# Patient Record
Sex: FEMALE | Race: WHITE | NOT HISPANIC OR LATINO | Employment: FULL TIME | URBAN - METROPOLITAN AREA
[De-identification: names, ages, dates, MRNs, and addresses within clinical notes are randomized per-mention and may not be internally consistent; named-entity substitution may affect disease eponyms.]

---

## 2017-05-10 ENCOUNTER — HOSPITAL ENCOUNTER (OUTPATIENT)
Dept: RADIOLOGY | Facility: CLINIC | Age: 27
Discharge: HOME | End: 2017-05-10
Admitting: FAMILY MEDICINE
Payer: COMMERCIAL

## 2017-05-10 ENCOUNTER — OFFICE VISIT (OUTPATIENT)
Dept: URGENT CARE | Facility: CLINIC | Age: 27
End: 2017-05-10
Payer: COMMERCIAL

## 2017-05-10 DIAGNOSIS — R52 PAIN: ICD-10-CM

## 2017-05-10 PROCEDURE — G0382 LEV 3 HOSP TYPE B ED VISIT: HCPCS

## 2017-05-10 PROCEDURE — 72050 X-RAY EXAM NECK SPINE 4/5VWS: CPT

## 2018-01-12 NOTE — MISCELLANEOUS
Message  Return to work or school:    She is able to return to work on  Monday, 5/2/16    She is able to work with limitations (lifting restrictions)  Lifting restrictions: Week 1 to 2 of lifting no greater than 15lbs  Week 3 to 4 of lifting no greater than 25lbs  Noted faxed to Romelia Flores @ [phone] 4:20pm cs/lpn        Signatures   Electronically signed by : Moo Henley, ; Apr 28 2016  4:11PM EST                       (Author)    Electronically signed by : Bessie Clifton MD; May  4 2016  2:09PM EST                       (Author)

## 2018-05-30 ENCOUNTER — OFFICE VISIT (OUTPATIENT)
Dept: URGENT CARE | Facility: CLINIC | Age: 28
End: 2018-05-30
Payer: COMMERCIAL

## 2018-05-30 ENCOUNTER — HOSPITAL ENCOUNTER (OUTPATIENT)
Dept: CT IMAGING | Facility: HOSPITAL | Age: 28
Discharge: HOME | End: 2018-05-30
Payer: COMMERCIAL

## 2018-05-30 ENCOUNTER — TRANSCRIBE ORDERS (OUTPATIENT)
Dept: ADMINISTRATIVE | Facility: HOSPITAL | Age: 28
End: 2018-05-30

## 2018-05-30 ENCOUNTER — APPOINTMENT (OUTPATIENT)
Dept: LAB | Facility: HOSPITAL | Age: 28
End: 2018-05-30
Payer: COMMERCIAL

## 2018-05-30 ENCOUNTER — HOSPITAL ENCOUNTER (EMERGENCY)
Facility: HOSPITAL | Age: 28
Discharge: ANOTHER HOSPITAL | End: 2018-05-30
Attending: EMERGENCY MEDICINE
Payer: COMMERCIAL

## 2018-05-30 VITALS
SYSTOLIC BLOOD PRESSURE: 137 MMHG | HEIGHT: 60 IN | HEART RATE: 113 BPM | TEMPERATURE: 100.9 F | RESPIRATION RATE: 20 BRPM | WEIGHT: 212 LBS | BODY MASS INDEX: 41.62 KG/M2 | DIASTOLIC BLOOD PRESSURE: 81 MMHG | OXYGEN SATURATION: 99 %

## 2018-05-30 VITALS
DIASTOLIC BLOOD PRESSURE: 64 MMHG | WEIGHT: 212 LBS | RESPIRATION RATE: 16 BRPM | OXYGEN SATURATION: 98 % | BODY MASS INDEX: 41.62 KG/M2 | HEART RATE: 110 BPM | SYSTOLIC BLOOD PRESSURE: 112 MMHG | HEIGHT: 60 IN | TEMPERATURE: 100.9 F

## 2018-05-30 DIAGNOSIS — K59.00 CONSTIPATION, UNSPECIFIED CONSTIPATION TYPE: ICD-10-CM

## 2018-05-30 DIAGNOSIS — A41.9 SEPSIS (HCC): ICD-10-CM

## 2018-05-30 DIAGNOSIS — N39.0 ACUTE UTI: Primary | ICD-10-CM

## 2018-05-30 DIAGNOSIS — K50.819 CROHN'S DISEASE OF SMALL AND LARGE INTESTINES WITH COMPLICATION (HCC): ICD-10-CM

## 2018-05-30 DIAGNOSIS — R10.32 LEFT LOWER QUADRANT PAIN: ICD-10-CM

## 2018-05-30 DIAGNOSIS — R10.9 ABDOMINAL PAIN, UNSPECIFIED ABDOMINAL LOCATION: ICD-10-CM

## 2018-05-30 DIAGNOSIS — R10.9 ABDOMINAL PAIN, UNSPECIFIED ABDOMINAL LOCATION: Primary | ICD-10-CM

## 2018-05-30 DIAGNOSIS — N70.93 LEFT PYOSALPINX: Primary | ICD-10-CM

## 2018-05-30 LAB
ALBUMIN SERPL BCP-MCNC: 3.2 G/DL (ref 3.5–5)
ALP SERPL-CCNC: 55 U/L (ref 46–116)
ALT SERPL W P-5'-P-CCNC: 25 U/L (ref 12–78)
ANION GAP SERPL CALCULATED.3IONS-SCNC: 10 MMOL/L (ref 4–13)
AST SERPL W P-5'-P-CCNC: 16 U/L (ref 5–45)
BASOPHILS # BLD AUTO: 0.05 THOUSANDS/ΜL (ref 0–0.1)
BASOPHILS NFR BLD AUTO: 0 % (ref 0–1)
BILIRUB SERPL-MCNC: 0.8 MG/DL (ref 0.2–1)
BUN SERPL-MCNC: 10 MG/DL (ref 5–25)
CALCIUM SERPL-MCNC: 9.3 MG/DL (ref 8.3–10.1)
CHLORIDE SERPL-SCNC: 98 MMOL/L (ref 100–108)
CO2 SERPL-SCNC: 28 MMOL/L (ref 21–32)
CREAT SERPL-MCNC: 0.77 MG/DL (ref 0.6–1.3)
EOSINOPHIL # BLD AUTO: 0.08 THOUSAND/ΜL (ref 0–0.61)
EOSINOPHIL NFR BLD AUTO: 1 % (ref 0–6)
ERYTHROCYTE [DISTWIDTH] IN BLOOD BY AUTOMATED COUNT: 13.2 % (ref 11.6–15.1)
GFR SERPL CREATININE-BSD FRML MDRD: 105 ML/MIN/1.73SQ M
GLUCOSE SERPL-MCNC: 84 MG/DL (ref 65–140)
HCG SERPL QL: NEGATIVE
HCT VFR BLD AUTO: 36.8 % (ref 34.8–46.1)
HGB BLD-MCNC: 12 G/DL (ref 11.5–15.4)
IMM GRANULOCYTES # BLD AUTO: 0.07 THOUSAND/UL (ref 0–0.2)
IMM GRANULOCYTES NFR BLD AUTO: 0 % (ref 0–2)
LACTATE SERPL-SCNC: 0.8 MMOL/L (ref 0.5–2)
LYMPHOCYTES # BLD AUTO: 2.22 THOUSANDS/ΜL (ref 0.6–4.47)
LYMPHOCYTES NFR BLD AUTO: 14 % (ref 14–44)
MCH RBC QN AUTO: 27.1 PG (ref 26.8–34.3)
MCHC RBC AUTO-ENTMCNC: 32.6 G/DL (ref 31.4–37.4)
MCV RBC AUTO: 83 FL (ref 82–98)
MONOCYTES # BLD AUTO: 1.25 THOUSAND/ΜL (ref 0.17–1.22)
MONOCYTES NFR BLD AUTO: 8 % (ref 4–12)
NEUTROPHILS # BLD AUTO: 12.6 THOUSANDS/ΜL (ref 1.85–7.62)
NEUTS SEG NFR BLD AUTO: 77 % (ref 43–75)
NRBC BLD AUTO-RTO: 0 /100 WBCS
PLATELET # BLD AUTO: 499 THOUSANDS/UL (ref 149–390)
PMV BLD AUTO: 9.3 FL (ref 8.9–12.7)
POTASSIUM SERPL-SCNC: 3.8 MMOL/L (ref 3.5–5.3)
PROT SERPL-MCNC: 8 G/DL (ref 6.4–8.2)
RBC # BLD AUTO: 4.42 MILLION/UL (ref 3.81–5.12)
SL AMB  POCT GLUCOSE, UA: NEGATIVE
SL AMB LEUKOCYTE ESTERASE,UA: ABNORMAL
SL AMB POCT BILIRUBIN,UA: NEGATIVE
SL AMB POCT BLOOD,UA: ABNORMAL
SL AMB POCT CLARITY,UA: CLEAR
SL AMB POCT COLOR,UA: YELLOW
SL AMB POCT KETONES,UA: ABNORMAL
SL AMB POCT NITRITE,UA: NEGATIVE
SL AMB POCT PH,UA: 8.5
SL AMB POCT SPECIFIC GRAVITY,UA: 1
SL AMB POCT URINE PROTEIN: NEGATIVE
SL AMB POCT UROBILINOGEN: 0.2
SODIUM SERPL-SCNC: 136 MMOL/L (ref 136–145)
VIT B12 SERPL-MCNC: 292 PG/ML (ref 100–900)
WBC # BLD AUTO: 16.27 THOUSAND/UL (ref 4.31–10.16)

## 2018-05-30 PROCEDURE — 99284 EMERGENCY DEPT VISIT MOD MDM: CPT

## 2018-05-30 PROCEDURE — G0382 LEV 3 HOSP TYPE B ED VISIT: HCPCS | Performed by: FAMILY MEDICINE

## 2018-05-30 PROCEDURE — 85025 COMPLETE CBC W/AUTO DIFF WBC: CPT

## 2018-05-30 PROCEDURE — 80053 COMPREHEN METABOLIC PANEL: CPT

## 2018-05-30 PROCEDURE — 96365 THER/PROPH/DIAG IV INF INIT: CPT

## 2018-05-30 PROCEDURE — 83605 ASSAY OF LACTIC ACID: CPT | Performed by: EMERGENCY MEDICINE

## 2018-05-30 PROCEDURE — 87591 N.GONORRHOEAE DNA AMP PROB: CPT | Performed by: EMERGENCY MEDICINE

## 2018-05-30 PROCEDURE — 81002 URINALYSIS NONAUTO W/O SCOPE: CPT | Performed by: FAMILY MEDICINE

## 2018-05-30 PROCEDURE — 87491 CHLMYD TRACH DNA AMP PROBE: CPT | Performed by: EMERGENCY MEDICINE

## 2018-05-30 PROCEDURE — 96375 TX/PRO/DX INJ NEW DRUG ADDON: CPT

## 2018-05-30 PROCEDURE — 84703 CHORIONIC GONADOTROPIN ASSAY: CPT

## 2018-05-30 PROCEDURE — 87040 BLOOD CULTURE FOR BACTERIA: CPT | Performed by: EMERGENCY MEDICINE

## 2018-05-30 PROCEDURE — 36415 COLL VENOUS BLD VENIPUNCTURE: CPT

## 2018-05-30 PROCEDURE — 74177 CT ABD & PELVIS W/CONTRAST: CPT

## 2018-05-30 PROCEDURE — 82607 VITAMIN B-12: CPT

## 2018-05-30 PROCEDURE — 87086 URINE CULTURE/COLONY COUNT: CPT | Performed by: FAMILY MEDICINE

## 2018-05-30 RX ORDER — KETOROLAC TROMETHAMINE 30 MG/ML
15 INJECTION, SOLUTION INTRAMUSCULAR; INTRAVENOUS ONCE
Status: COMPLETED | OUTPATIENT
Start: 2018-05-30 | End: 2018-05-30

## 2018-05-30 RX ORDER — DOXYCYCLINE HYCLATE 100 MG/1
100 CAPSULE ORAL ONCE
Status: COMPLETED | OUTPATIENT
Start: 2018-05-30 | End: 2018-05-30

## 2018-05-30 RX ORDER — CIPROFLOXACIN 500 MG/1
500 TABLET, FILM COATED ORAL EVERY 12 HOURS SCHEDULED
Qty: 14 TABLET | Refills: 0 | Status: SHIPPED | OUTPATIENT
Start: 2018-05-30 | End: 2018-06-06

## 2018-05-30 RX ORDER — LEVONORGESTREL AND ETHINYL ESTRADIOL 0.1-0.02MG
KIT ORAL
COMMUNITY
Start: 2018-05-24 | End: 2021-06-22

## 2018-05-30 RX ADMIN — DOXYCYCLINE HYCLATE 100 MG: 100 CAPSULE ORAL at 22:31

## 2018-05-30 RX ADMIN — CEFOXITIN: 1 INJECTION, POWDER, FOR SOLUTION INTRAVENOUS at 22:34

## 2018-05-30 RX ADMIN — IOHEXOL 100 ML: 350 INJECTION, SOLUTION INTRAVENOUS at 19:32

## 2018-05-30 RX ADMIN — KETOROLAC TROMETHAMINE 15 MG: 30 INJECTION, SOLUTION INTRAMUSCULAR; INTRAVENOUS at 22:32

## 2018-05-30 NOTE — PATIENT INSTRUCTIONS
Urinalysis shows large leukocytes and trace blood  Treat with Cipro for 7 days duration given the presentation of sharp left lower quadrant abdominal pain which could be consistent with acute diverticulitis but lower suspicion given her age  Patient was instructed to follow up with PCP in 2 days if not improving on the antibiotics or go to the emergency department if symptoms worsen as she would ultimately need a CT scan of the abdomen and pelvis

## 2018-05-30 NOTE — PROGRESS NOTES
[address] [facility]        NAME: Beryl Churchill is a 28 y o  female  : 1990    MRN: [id number]  DATE: May 30, 2018  TIME: 1:13 PM    Assessment and Plan   Acute UTI [N39 0]  1  Acute UTI  ciprofloxacin (CIPRO) 500 mg tablet   2  Left lower quadrant pain  ciprofloxacin (CIPRO) 500 mg tablet         Patient Instructions       Urinalysis shows large leukocytes and trace blood  Treat with Cipro for 7 days duration given the presentation of sharp left lower quadrant abdominal pain which could be consistent with acute diverticulitis but lower suspicion given her age  Patient was instructed to follow up with PCP in 2 days if not improving on the antibiotics or go to the emergency department if symptoms worsen as she would ultimately need a CT scan of the abdomen and pelvis  Chief Complaint     Chief Complaint   Patient presents with    Abdominal Pain     Pt reports LLQ pain that began yesterday  She also reports nausea, dizziness & urinary urgency  History of Present Illness       Patient presents with complaint of predominantly left lower quadrant discomfort that began approximately 10 days ago had sharp in nature  She also had urinary urgency but the symptoms resolved when she started her menstrual cycle, once her cycle completed she started with recurrence of the same left lower quadrant sharp abdominal pain and urinary urgency  She does have a history of Crohn's disease status post resection of the terminal ileum  She is having nausea, no vomiting, she is constipated otherwise no change in the caliber of the stool, no melena or hematochezia no diarrhea  She denies dysuria or flank pain  She does have a current temp of 100 9° and she had chills last night  Review of Systems   Review of Systems   Constitutional: Positive for chills  HENT: Negative  Respiratory: Negative  Cardiovascular: Negative  Gastrointestinal: Positive for abdominal pain, constipation and nausea   Negative for anal bleeding, blood in stool, diarrhea, rectal pain and vomiting  Genitourinary: Positive for frequency  Negative for dysuria, flank pain, urgency, vaginal bleeding and vaginal discharge  Musculoskeletal: Negative  Current Medications       Current Outpatient Prescriptions:     AVIANE 0 1-20 MG-MCG per tablet, , Disp: , Rfl:     ciprofloxacin (CIPRO) 500 mg tablet, Take 1 tablet (500 mg total) by mouth every 12 (twelve) hours for 7 days, Disp: 14 tablet, Rfl: 0    Current Allergies     Allergies as of 05/30/2018 - Reviewed 05/30/2018   Allergen Reaction Noted    Humira [adalimumab] Other (See Comments) 04/22/2016            The following portions of the patient's history were reviewed and updated as appropriate: allergies, current medications, past family history, past medical history, past social history, past surgical history and problem list      Past Medical History:   Diagnosis Date    [facility]'s disease ([facility] )     RA [facility]rheumatoid arthritis) ([facility] )     Rheumatoid arthritis ([facility] )        Past Surgical History:   Procedure Laterality Date    APPENDECTOMY      2010    BOWEL RESECTION      2014    CT REPAIR INCISIONAL HERNIA,REDUCIBLE N/A 4/26/2016    Procedure: OPEN INCISIONAL HERNIA REPAIR X4 W/ UMBILICAL HERNIA REPAIR WITH MESH;  Surgeon: Bessie Huston MD;  Location: [facility] [place] OR;  Service: General    SALPINGECTOMY Right     2011       No family history on file  Medications have been verified  Objective   /64   Pulse (!) 110   Temp (!) 100 9 °F (38 3 °C)   Resp 16   Ht 5' (1 524 m)   Wt 96 2 kg (212 lb)   LMP 05/20/2018   SpO2 98%   BMI 41 40 kg/m²        Physical Exam     Physical Exam   Constitutional: She appears well-developed  No distress  Neck: Neck supple  Cardiovascular: Normal rate, regular rhythm and normal heart sounds  Pulmonary/Chest: Effort normal and breath sounds normal    Abdominal: Soft  Bowel sounds are normal  She exhibits no distension  There is tenderness  There is no rebound and no guarding  Tender to palpation suprapubic and left lower quadrant, no rebound rigidity or guarding   Lymphadenopathy:     She has no cervical adenopathy  Skin: She is not diaphoretic

## 2018-05-31 LAB — BACTERIA UR CULT: NORMAL

## 2018-05-31 NOTE — EMTALA/ACUTE CARE TRANSFER
[address]  [address] [place] [address]  Dept: [phone]      EMTALA TRANSFER CONSENT    NAME Glenn Newman                                         1990                              MRN [id number]    I have been informed of my rights regarding examination, treatment, and transfer   by Dr Daniel Cruz MD    Benefits: Continuity of care    Risks: Potential for delay in receiving treatment, Potential deterioration of medical condition, Loss of IV, Increased discomfort during transfer, Possible worsening of condition or death during transfer      Consent for Transfer:  I acknowledge that my medical condition has been evaluated and explained to me by the emergency department physician or other qualified medical person and/or my attending physician, who has recommended that I be transferred to the service of  Accepting Physician: Dr Lori Gonzalez at [address] Name, [address] : [facility]  The above potential benefits of such transfer, the potential risks associated with such transfer, and the probable risks of not being transferred have been explained to me, and I fully understand them  The doctor has explained that, in my case, the benefits of transfer outweigh the risks  I agree to be transferred  I authorize the performance of emergency medical procedures and treatments upon me in both transit and upon arrival at the receiving facility  Additionally, I authorize the release of any and all medical records to the receiving facility and request they be transported with me, if possible  I understand that the safest mode of transportation during a medical emergency is an ambulance and that the Hospital advocates the use of this mode of transport   Risks of traveling to the receiving facility by car, including absence of medical control, life sustaining equipment, such as oxygen, and medical personnel has been explained to me and I fully understand them  (GLENN CORRECT BOX BELOW)  [  ]  I consent to the stated transfer and to be transported by ambulance/helicopter  [  ]  I consent to the stated transfer, but refuse transportation by ambulance and accept full responsibility for my transportation by car  I understand the risks of non-ambulance transfers and I exonerate the Hospital and its staff from any deterioration in my condition that results from this refusal     X___________________________________________    DATE  18  TIME________  Signature of patient or legally responsible individual signing on patient behalf           RELATIONSHIP TO PATIENT_________________________          Provider Certification    NAME Giovana Segura                                         1990                              MRN [id number]    A medical screening exam was performed on the above named patient  Based on the examination:    Condition Necessitating Transfer The primary encounter diagnosis was Left pyosalpinx  A diagnosis of Sepsis ([address] ) was also pertinent to this visit  Patient Condition: The patient has been stabilized such that within reasonable medical probability, no material deterioration of the patient condition or the condition of the unborn child(kenton) is likely to result from the transfer    Reason for Transfer:      Transfer Requirements: [facility]   · Space available and qualified personnel available for treatment as acknowledged by    · Agreed to accept transfer and to provide appropriate medical treatment as acknowledged by       Dr Segun Villafuerte  · Appropriate medical records of the examination and treatment of the patient are provided at the time of transfer   [address], [address] _______  · Transfer will be performed by qualified personnel from    and appropriate transfer equipment as required, including the use of necessary and appropriate life support measures      Provider Certification: I have examined the patient and explained the following risks and benefits of being transferred/refusing transfer to the patient/family:  General risk, such as traffic hazards, adverse weather conditions, rough terrain or turbulence, possible failure of equipment (including vehicle or aircraft), or consequences of actions of persons outside the control of the transport personnel, Unanticipated needs of medical equipment and personnel during transport, Risk of worsening condition, The possibility of a transport vehicle being unavailable      Based on these reasonable risks and benefits to the patient and/or the unborn child(kenton), and based upon the information available at the time of the patients examination, I certify that the medical benefits reasonably to be expected from the provision of appropriate medical treatments at another medical facility outweigh the increasing risks, if any, to the individuals medical condition, and in the case of labor to the unborn child, from effecting the transfer      X____________________________________________ DATE 05/30/18        TIME_______      ORIGINAL - SEND TO MEDICAL RECORDS   COPY - SEND WITH PATIENT DURING TRANSFER

## 2018-05-31 NOTE — ED PROVIDER NOTES
History  Chief Complaint   Patient presents with    Abdominal Pain     Patient states"she was seen here for a CT scan of the abdomen today and contacted by her physician for abnormal CT scan results"  Patient is having LLQ abdominal pain along with nausea  28year old  presents for evaluation of LLQ abdominal pain worsening for the past 2 days  Patient states she had a similar pain 2 weeks ago which had resolved during her menstrual cycle, recurring yesterday  Patient reports sharp pain of the LLQ associated with subjective fevers and chills  Patient has not taken anything for her symptoms prior to presentation  Patient saw her PCP earlier today and had labs and CT performed  CT suggestive of pyosalpinx concerning for PID  Leukocytosis on outpatient labs  Negative serum beta hcg today  Patient has history of prior salpingectomy for similar presentation  Multiple prior abdominal surgeries including appendectomy and bowel resection for Crohns  Patient states she is sexually active with a single partner and uses condoms regularly  She denies vaginal bleeding or discharge           History provided by:  Patient  Abdominal Pain   Pain location:  LLQ  Pain quality: sharp    Pain radiates to:  Does not radiate  Pain severity:  Mild  Onset quality:  Gradual  Duration:  2 days  Timing:  Constant  Progression:  Waxing and waning  Chronicity:  New  Context: previous surgery    Relieved by:  None tried  Worsened by:  Nothing  Ineffective treatments:  None tried  Associated symptoms: chills and fever    Associated symptoms: no chest pain, no constipation, no cough, no diarrhea, no dysuria, no hematuria, no nausea, no shortness of breath, no sore throat and no vomiting    Fever:     Duration:  2 days    Timing:  Intermittent    Temp source:  Subjective    Progression:  Waxing and waning  Risk factors: multiple surgeries and obesity        Prior to Admission Medications   Prescriptions Last Dose Informant Patient Reported? Taking? AVIANE 0 1-20 MG-MCG per tablet   Yes No   ciprofloxacin (CIPRO) 500 mg tablet   No No   Sig: Take 1 tablet (500 mg total) by mouth every 12 (twelve) hours for 7 days      Facility-Administered Medications: None       Past Medical History:   Diagnosis Date    [facility]'s disease ([facility] )     RA (rheumatoid arthritis) ([place] [address] )     Rheumatoid arthritis ([place] [address] )        Past Surgical History:   Procedure Laterality Date    APPENDECTOMY      2010    BOWEL RESECTION      2014    VT REPAIR INCISIONAL HERNIA,REDUCIBLE N/A 4/26/2016    Procedure: OPEN INCISIONAL HERNIA REPAIR X4 W/ UMBILICAL HERNIA REPAIR WITH MESH;  Surgeon: Shanta Naik MD;  Location: [facility] OR;  Service: General    SALPINGECTOMY Right     2011       History reviewed  No pertinent family history  I have reviewed and agree with the history as documented  Social History   Substance Use Topics    Smoking status: Never Smoker    Smokeless tobacco: Never Used    Alcohol use No        Review of Systems   Constitutional: Positive for chills and fever  Negative for appetite change  HENT: Negative for congestion, rhinorrhea and sore throat  Respiratory: Negative for cough, chest tightness and shortness of breath  Cardiovascular: Negative for chest pain, palpitations and leg swelling  Gastrointestinal: Positive for abdominal pain  Negative for constipation, diarrhea, nausea and vomiting  Genitourinary: Negative for dysuria, frequency and hematuria  Musculoskeletal: Negative for myalgias, neck pain and neck stiffness  Skin: Negative for pallor  Neurological: Negative for syncope, weakness and headaches  All other systems reviewed and are negative  Physical Exam  Physical Exam   Constitutional: She is oriented to person, place, and time  She appears well-developed and well-nourished  Non-toxic appearance  No distress  HENT:   Head: Normocephalic and atraumatic     Eyes: Conjunctivae and EOM are normal  Pupils are equal, round, and reactive to light  Neck: Normal range of motion  Neck supple  No tracheal deviation present  No thyromegaly present  Cardiovascular: Regular rhythm, normal heart sounds and intact distal pulses  Tachycardia present  Pulmonary/Chest: Effort normal and breath sounds normal    Abdominal: Soft  Bowel sounds are normal  She exhibits no distension  There is tenderness in the left lower quadrant  There is no rigidity, no rebound and no guarding  Genitourinary: Uterus is tender  Cervix exhibits no motion tenderness and no friability  Left adnexum displays tenderness  Vaginal discharge (mild) found  Lymphadenopathy:     She has no cervical adenopathy  Neurological: She is alert and oriented to person, place, and time  Skin: Skin is warm and dry  She is not diaphoretic  Psychiatric: She has a normal mood and affect  Her behavior is normal  Judgment and thought content normal    Nursing note and vitals reviewed        Vital Signs  ED Triage Vitals [05/30/18 2121]   Temperature Pulse Respirations Blood Pressure SpO2   (!) 100 7 °F (38 2 °C) (!) 113 20 137/81 99 %      Temp Source Heart Rate Source Patient Position - Orthostatic VS BP Location FiO2 (%)   Tympanic Monitor Lying Right arm --      Pain Score       --           Vitals:    05/30/18 2121   BP: 137/81   Pulse: (!) 113   Patient Position - Orthostatic VS: Lying       Visual Acuity      ED Medications  Medications   cefOXitin (MEFOXIN) 2,000 mg in sodium chloride 0 9 % 50 mL IVPB ( Intravenous New Bag 5/30/18 2234)   doxycycline hyclate (VIBRAMYCIN) capsule 100 mg (100 mg Oral Given 5/30/18 2231)   ketorolac (TORADOL) injection 15 mg (15 mg Intravenous Given 5/30/18 2232)       Diagnostic Studies  Results Reviewed     Procedure Component Value Units Date/Time    Chlamydia/GC amplified DNA by PCR [[id number]] Collected:  05/30/18 2236    Lab Status:  No result Specimen:  Genital from Cervix     Lactic acid, plasma [[id number]]  (Normal) Collected:  05/30/18 2202    Lab Status:  Final result Specimen:  Blood from Arm, Right Updated:  05/30/18 2235     LACTIC ACID 0 8 mmol/L     Narrative:         Result may be elevated if tourniquet was used during collection  Blood culture #1 [[id number]] Collected:  05/30/18 2202    Lab Status: In process Specimen:  Blood from Arm, Left Updated:  05/30/18 2214    Blood culture #2 [[id number]] Collected:  05/30/18 2202    Lab Status: In process Specimen:  Blood from Arm, Right Updated:  05/30/18 2214                 No orders to display              Procedures  Procedures       Phone Contacts  ED Phone Contact    ED Course  ED Course as of May 30 2239   Wed May 30, 2018   2203 Pulse: (!) 113   2203 Temperature: (!) 100 7 °F (38 2 °C)   2203 Leukocytosis of 16 on labs performed today                          Initial Sepsis Screening     Row Name 05/30/18 2239 05/30/18 2153             Is the patient's history suggestive of a new or worsening infection?  -- (!)  Yes (Proceed)  -EE       Suspected source of infection  -- --   pelvic infection  -EE       Are two or more of the following signs & symptoms of infection both present and new to the patient?  -- (!)  Yes (Proceed)  -EE       Indicate SIRS criteria  -- Tachycardia > 90 bpm;Leukocytosis (WBC > [address]34[address] IJL)  -EE       If the answer is yes to both questions, suspicion of sepsis is present  --  --       If severe sepsis is present AND tissue hypoperfusion perists in the hour after fluid resuscitation or lactate > 4, the patient meets criteria for SEPTIC SHOCK  --  --       Are any of the following organ dysfunction criteria present within 6 hours of suspected infection and SIRS criteria that are NOT considered to be chronic conditions?  No  -EE  --       Organ dysfunction  --  --       Date of presentation of severe sepsis  --  --       Time of presentation of severe sepsis  --  --       Tissue hypoperfusion persists in the hour after crystalloid fluid administration, evidenced, by either:  --  --       Was hypotension present within one hour of the conclusion of crystalloid fluid administration?  --  --       Date of presentation of septic shock  --  --       Time of presentation of septic shock  --  --         User Key  (r) = Recorded By, (t) = Taken By, (c) = Cosigned By    [address] Name Provider Type     Patricia Florez MD Physician                  MDM  Number of Diagnoses or Management Options  Left pyosalpinx: new and requires workup  Sepsis [facility]): new and requires workup  Diagnosis management comments: 28year old female presenting for evaluation of possible complicated PID with pyosalpinx on outpatient CT  Patient meets sepsis criteria  Blood cultures sent  Antibiotics for PID/TOA ordered  Discussed case with Dr Asif Antoine of [address] [place] [address]) who recommended transfer to [facility] [place] for continuity of care as patient's OB/GYN is with [facility]          Amount and/or Complexity of Data Reviewed  Clinical lab tests: reviewed  Tests in the radiology section of CPT®: reviewed    Patient Progress  Patient progress: stable    CritCare Time    Disposition  Final diagnoses:   Left pyosalpinx   Sepsis ([address] )     Time reflects when diagnosis was documented in both MDM as applicable and the Disposition within this note     Time User Action Codes Description Comment    5/30/2018 10:29 PM Jono Morgan Add [N70 93] Left pyosalpinx     5/30/2018 10:29 PM Jono Morgan Add [A41 9] Sepsis [facility])       ED Disposition     ED Disposition Condition Comment    Transfer to Another Facility  Kiana Cooley should be transferred out to Albino Hardy MD Documentation      Most Recent Value   Patient Condition  The patient has been stabilized such that within reasonable medical probability, no material deterioration of the patient condition or the condition of the unborn child(kenton) is likely to result from the transfer Benefits of Transfer  Continuity of care   Risks of Transfer  Potential for delay in receiving treatment, Potential deterioration of medical condition, Loss of IV, Increased discomfort during transfer, Possible worsening of condition or death during transfer   Accepting Physician  Dr Serenity Simpson Name, James Stark   Provider Certification  General risk, such as traffic hazards, adverse weather conditions, rough terrain or turbulence, possible failure of equipment (including vehicle or aircraft), or consequences of actions of persons outside the control of the transport personnel, Unanticipated needs of medical equipment and personnel during transport, Risk of worsening condition, The possibility of a transport vehicle being unavailable      RN Documentation      Most [facility] Name, James Moore      Follow-up Information    None         Patient's Medications   Discharge Prescriptions    No medications on file     No discharge procedures on file      ED Provider  Electronically Signed by           Dolores Beyer MD  05/30/18 3181

## 2018-05-31 NOTE — EMTALA/ACUTE CARE TRANSFER
[address]  [address] [facility] [address]  Dept: [phone]      EMTALA TRANSFER CONSENT    NAME Juan Antonio Vasques                                         1990                              MRN [id number]    I have been informed of my rights regarding examination, treatment, and transfer   by Dr Chay Parker DO    Benefits: Continuity of care    Risks: Potential for delay in receiving treatment, Potential deterioration of medical condition, Loss of IV, Increased discomfort during transfer, Possible worsening of condition or death during transfer      Transfer Request   I acknowledge that my medical condition has been evaluated and explained to me by the emergency department physician or other qualified medical person and/or my attending physician who has recommended and offered to me further medical examination and treatment  I understand the Hospital's obligation with respect to the treatment and stabilization of my emergency medical condition  I nevertheless request to be transferred  I release the Hospital, the doctor, and any other persons caring for me from all responsibility or liability for any injury or ill effects that may result from my transfer and agree to accept all responsibility for the consequences of my choice to transfer, rather than receive stabilizing treatment at the Hospital  I understand that because the transfer is my request, my insurance may not provide reimbursement for the services  The Hospital will assist and direct me and my family in how to make arrangements for transfer, but the hospital is not liable for any fees charged by the transport service    In spite of this understanding, I refuse to consent to further medical examination and treatment which has been offered to me, and request transfer to [address], [address] : [facility]  I authorize the performance of emergency medical procedures and treatments upon me in both transit and upon arrival at the receiving facility  Additionally, I authorize the release of any and all medical records to the receiving facility and request they be transported with me, if possible  I authorize the performance of emergency medical procedures and treatments upon me in both transit and upon arrival at the receiving facility  Additionally, I authorize the release of any and all medical records to the receiving facility and request they be transported with me, if possible  I understand that the safest mode of transportation during a medical emergency is an ambulance and that the Hospital advocates the use of this mode of transport  Risks of traveling to the receiving facility by car, including absence of medical control, life sustaining equipment, such as oxygen, and medical personnel has been explained to me and I fully understand them  (GLENN CORRECT BOX BELOW)  [  ]  I consent to the stated transfer and to be transported by ambulance/helicopter  [  ]  I consent to the stated transfer, but refuse transportation by ambulance and accept full responsibility for my transportation by car  I understand the risks of non-ambulance transfers and I exonerate the Hospital and its staff from any deterioration in my condition that results from this refusal     X___________________________________________    DATE  18  TIME________  Signature of patient or legally responsible individual signing on patient behalf           RELATIONSHIP TO PATIENT_________________________          Provider Certification    NAME Kiana Cooley                                         1990                              MRN [id number]    A medical screening exam was performed on the above named patient  Based on the examination:    Condition Necessitating Transfer The primary encounter diagnosis was Left pyosalpinx   A diagnosis of Sepsis ([address] ) was also pertinent to this visit  Patient Condition: The patient has been stabilized such that within reasonable medical probability, no material deterioration of the patient condition or the condition of the unborn child(kenton) is likely to result from the transfer    Reason for Transfer:      Transfer Requirements: [facility]   · Space available and qualified personnel available for treatment as acknowledged by    · Agreed to accept transfer and to provide appropriate medical treatment as acknowledged by       Dr Michael Rico  · Appropriate medical records of the examination and treatment of the patient are provided at the time of transfer   [address] [facility], [address] _______  · Transfer will be performed by qualified personnel from [facility]  and appropriate transfer equipment as required, including the use of necessary and appropriate life support measures      Provider Certification: I have examined the patient and explained the following risks and benefits of being transferred/refusing transfer to the patient/family:  General risk, such as traffic hazards, adverse weather conditions, rough terrain or turbulence, possible failure of equipment (including vehicle or aircraft), or consequences of actions of persons outside the control of the transport personnel, Unanticipated needs of medical equipment and personnel during transport, Risk of worsening condition, The possibility of a transport vehicle being unavailable      Based on these reasonable risks and benefits to the patient and/or the unborn child(kenton), and based upon the information available at the time of the patients examination, I certify that the medical benefits reasonably to be expected from the provision of appropriate medical treatments at another medical facility outweigh the increasing risks, if any, to the individuals medical condition, and in the case of labor to the unborn child, from effecting the transfer      X____________________________________________ DATE 05/30/18        TIME_______      ORIGINAL - SEND TO MEDICAL RECORDS   COPY - SEND WITH PATIENT DURING TRANSFER

## 2018-06-01 LAB
CHLAMYDIA DNA CVX QL NAA+PROBE: NORMAL
N GONORRHOEA DNA GENITAL QL NAA+PROBE: NORMAL

## 2018-06-05 LAB
BACTERIA BLD CULT: NORMAL
BACTERIA BLD CULT: NORMAL

## 2019-09-19 ENCOUNTER — OFFICE VISIT (OUTPATIENT)
Dept: URGENT CARE | Facility: CLINIC | Age: 29
End: 2019-09-19
Payer: COMMERCIAL

## 2019-09-19 VITALS
RESPIRATION RATE: 16 BRPM | DIASTOLIC BLOOD PRESSURE: 78 MMHG | BODY MASS INDEX: 39.27 KG/M2 | OXYGEN SATURATION: 97 % | TEMPERATURE: 101.1 F | HEIGHT: 60 IN | SYSTOLIC BLOOD PRESSURE: 124 MMHG | WEIGHT: 200 LBS | HEART RATE: 94 BPM

## 2019-09-19 DIAGNOSIS — J01.00 ACUTE NON-RECURRENT MAXILLARY SINUSITIS: Primary | ICD-10-CM

## 2019-09-19 PROCEDURE — G0382 LEV 3 HOSP TYPE B ED VISIT: HCPCS | Performed by: PREVENTIVE MEDICINE

## 2019-09-19 RX ORDER — AMOXICILLIN 500 MG/1
500 CAPSULE ORAL EVERY 8 HOURS SCHEDULED
Qty: 21 CAPSULE | Refills: 0 | Status: SHIPPED | OUTPATIENT
Start: 2019-09-19 | End: 2019-09-26

## 2019-09-19 NOTE — PROGRESS NOTES
[address]        NAME: Afia Simmons is a 33 y o  female  : 1990    MRN: [id number]  DATE: 2019  TIME: 6:05 PM    Assessment and Plan   Acute non-recurrent maxillary sinusitis [J01 00]  1  Acute non-recurrent maxillary sinusitis  amoxicillin (AMOXIL) 500 mg capsule         Patient Instructions       Follow up with PCP in 3-5 days  Proceed to  ER if symptoms worsen  Chief Complaint     Chief Complaint   Patient presents with    Cold Like Symptoms     began about 10 days ago  pt reports sinus pressure, HA, b/l eye pain and nasal congestion  History of Present Illness       A week-long history of on and off fever, nasal congestion, and increasing pressure and pain over the maxillary sinuses bilaterally  Mild cough  Review of Systems   Review of Systems   Constitutional: Positive for fever  HENT: Positive for congestion and sinus pressure  Respiratory: Positive for cough            Current Medications       Current Outpatient Medications:     AVIANE 0 1-20 MG-MCG per tablet, , Disp: , Rfl:     amoxicillin (AMOXIL) 500 mg capsule, Take 1 capsule (500 mg total) by mouth every 8 (eight) hours for 7 days, Disp: 21 capsule, Rfl: 0    Current Allergies     Allergies as of 2019 - Reviewed 2019   Allergen Reaction Noted    Humira [adalimumab] Other (See Comments) 2016            The following portions of the patient's history were reviewed and updated as appropriate: allergies, current medications, past family history, past medical history, past social history, past surgical history and problem list      Past Medical History:   Diagnosis Date    [facility] )     RA (rheumatoid arthritis) ([facility] )     Rheumatoid arthritis ([facility] )        Past Surgical History:   Procedure Laterality Date    APPENDECTOMY          BOWEL RESECTION          MA REPAIR INCISIONAL HERNIA,REDUCIBLE N/A 2016    Procedure: OPEN INCISIONAL HERNIA REPAIR X4 W/ UMBILICAL HERNIA REPAIR WITH MESH;  Surgeon: Melanie Caraballo MD;  Location: [facility];  Service: General    SALPINGECTOMY Right     2011       History reviewed  No pertinent family history  Medications have been verified  Objective   /78   Pulse 94   Temp (!) 101 1 °F (38 4 °C)   Resp 16   Ht 5' (1 524 m)   Wt 90 7 kg (200 lb)   SpO2 97%   BMI 39 06 kg/m²        Physical Exam     Physical Exam   HENT:   Right Ear: External ear normal    Left Ear: External ear normal    Mouth/Throat: Oropharynx is clear and moist    Sensation of discomfort and pain when tapping both maxillary sinuses   Cardiovascular: Normal heart sounds  Pulmonary/Chest: Breath sounds normal    Lymphadenopathy:     She has no cervical adenopathy

## 2020-10-14 ENCOUNTER — TELEPHONE (OUTPATIENT)
Dept: GASTROENTEROLOGY | Facility: CLINIC | Age: 30
End: 2020-10-14

## 2020-10-14 DIAGNOSIS — K50.813 CROHN'S DISEASE OF BOTH SMALL AND LARGE INTESTINE WITH FISTULA (HCC): Primary | ICD-10-CM

## 2020-10-14 RX ORDER — ONDANSETRON 4 MG/1
4 TABLET, FILM COATED ORAL EVERY 8 HOURS PRN
Qty: 20 TABLET | Refills: 0 | Status: SHIPPED | OUTPATIENT
Start: 2020-10-14 | End: 2021-11-15

## 2020-11-27 ENCOUNTER — TELEPHONE (OUTPATIENT)
Dept: GASTROENTEROLOGY | Facility: CLINIC | Age: 30
End: 2020-11-27

## 2020-11-27 ENCOUNTER — OFFICE VISIT (OUTPATIENT)
Dept: GASTROENTEROLOGY | Facility: CLINIC | Age: 30
End: 2020-11-27
Payer: COMMERCIAL

## 2020-11-27 VITALS
BODY MASS INDEX: 40.84 KG/M2 | WEIGHT: 208 LBS | HEIGHT: 60 IN | DIASTOLIC BLOOD PRESSURE: 68 MMHG | SYSTOLIC BLOOD PRESSURE: 120 MMHG

## 2020-11-27 DIAGNOSIS — K50.813 CROHN'S DISEASE OF BOTH SMALL AND LARGE INTESTINE WITH FISTULA (HCC): Primary | ICD-10-CM

## 2020-11-27 DIAGNOSIS — R10.32 LEFT LOWER QUADRANT ABDOMINAL PAIN: ICD-10-CM

## 2020-11-27 PROCEDURE — 99215 OFFICE O/P EST HI 40 MIN: CPT | Performed by: INTERNAL MEDICINE

## 2020-11-27 RX ORDER — MESALAMINE 1.2 G/1
2400 TABLET, DELAYED RELEASE ORAL
Qty: 60 TABLET | Refills: 5 | Status: SHIPPED | OUTPATIENT
Start: 2020-11-27 | End: 2021-09-27 | Stop reason: SDUPTHER

## 2020-12-18 DIAGNOSIS — K50.00 CROHN'S DISEASE OF SMALL INTESTINE WITHOUT COMPLICATION (HCC): Primary | ICD-10-CM

## 2020-12-18 RX ORDER — SODIUM CHLORIDE 9 MG/ML
20 INJECTION, SOLUTION INTRAVENOUS ONCE
Status: CANCELLED | OUTPATIENT
Start: 2021-01-08

## 2020-12-22 RX ORDER — USTEKINUMAB 90 MG/ML
90 INJECTION, SOLUTION SUBCUTANEOUS
Qty: 1 ML | Refills: 6 | Status: SHIPPED | OUTPATIENT
Start: 2020-12-22 | End: 2021-01-28 | Stop reason: SDUPTHER

## 2020-12-23 ENCOUNTER — HOSPITAL ENCOUNTER (OUTPATIENT)
Dept: INFUSION CENTER | Facility: HOSPITAL | Age: 30
End: 2020-12-23

## 2021-01-05 DIAGNOSIS — K50.00 CROHN'S DISEASE OF SMALL INTESTINE WITHOUT COMPLICATION (HCC): Primary | ICD-10-CM

## 2021-01-07 ENCOUNTER — TELEPHONE (OUTPATIENT)
Dept: GASTROENTEROLOGY | Facility: CLINIC | Age: 31
End: 2021-01-07

## 2021-01-07 NOTE — TELEPHONE ENCOUNTER
Lilly Mello from [facility] left VM mssg stating Pt has infusion tomorrow of Stelara; needs date changed in the computer to the 8th  CB # [id number]

## 2021-01-08 ENCOUNTER — HOSPITAL ENCOUNTER (OUTPATIENT)
Dept: INFUSION CENTER | Facility: HOSPITAL | Age: 31
Discharge: HOME | End: 2021-01-08
Payer: COMMERCIAL

## 2021-01-08 VITALS
DIASTOLIC BLOOD PRESSURE: 85 MMHG | HEART RATE: 69 BPM | RESPIRATION RATE: 18 BRPM | SYSTOLIC BLOOD PRESSURE: 137 MMHG | BODY MASS INDEX: 41.03 KG/M2 | WEIGHT: 210.1 LBS | TEMPERATURE: 97.9 F

## 2021-01-08 DIAGNOSIS — K50.00 CROHN'S DISEASE OF SMALL INTESTINE WITHOUT COMPLICATION (HCC): Primary | ICD-10-CM

## 2021-01-08 PROCEDURE — 96365 THER/PROPH/DIAG IV INF INIT: CPT

## 2021-01-08 RX ORDER — SODIUM CHLORIDE 9 MG/ML
20 INJECTION, SOLUTION INTRAVENOUS ONCE
Status: COMPLETED | OUTPATIENT
Start: 2021-01-08 | End: 2021-01-08

## 2021-01-08 RX ORDER — SODIUM CHLORIDE 9 MG/ML
20 INJECTION, SOLUTION INTRAVENOUS ONCE
Status: CANCELLED | OUTPATIENT
Start: 2021-01-08

## 2021-01-08 RX ADMIN — USTEKINUMAB 520 MG: 130 SOLUTION INTRAVENOUS at 10:48

## 2021-01-08 RX ADMIN — SODIUM CHLORIDE 20 ML/HR: 0.9 INJECTION, SOLUTION INTRAVENOUS at 10:48

## 2021-01-08 NOTE — PLAN OF CARE
Problem: Potential for Falls  Goal: Patient will remain free of falls  Description: INTERVENTIONS:  - Assess patient frequently for physical needs  -  Identify cognitive and physical deficits and behaviors that affect risk of falls    -  Luther fall precautions as indicated by assessment   - Educate patient/family on patient safety including physical limitations  - Instruct patient to call for assistance with activity based on assessment  - Modify environment to reduce risk of injury  - Consider OT/PT consult to assist with strengthening/mobility  Outcome: Progressing

## 2021-01-28 DIAGNOSIS — K50.813 CROHN'S DISEASE OF BOTH SMALL AND LARGE INTESTINE WITH FISTULA (HCC): ICD-10-CM

## 2021-01-29 RX ORDER — USTEKINUMAB 90 MG/ML
90 INJECTION, SOLUTION SUBCUTANEOUS
Qty: 1 ML | Refills: 6 | Status: SHIPPED | OUTPATIENT
Start: 2021-01-29 | End: 2021-02-05 | Stop reason: SDUPTHER

## 2021-02-04 DIAGNOSIS — K50.813 CROHN'S DISEASE OF BOTH SMALL AND LARGE INTESTINE WITH FISTULA (HCC): ICD-10-CM

## 2021-02-04 NOTE — TELEPHONE ENCOUNTER
Marian from [facility] in [place] left  mssg stating Stelara 90 mg syringe Rx rec'd 1/29 requires prior auth for approval  If their assistance is requested they need chart notes   ph # [phone] or can fax info to [phone]

## 2021-02-05 RX ORDER — USTEKINUMAB 90 MG/ML
90 INJECTION, SOLUTION SUBCUTANEOUS
Qty: 1 ML | Refills: 6 | Status: SHIPPED | OUTPATIENT
Start: 2021-02-05 | End: 2022-03-10

## 2021-02-05 NOTE — TELEPHONE ENCOUNTER
Camacho DUQUE was approved for Jameel Lynne (:88)  VS#[id number]  21 - 21     Her insurance requires her to use [facility]

## 2021-02-05 NOTE — TELEPHONE ENCOUNTER
I left a voicemail for patient advising her Stelara Injection has been approved  [facility]'s number to contact them is [phone] she is due for her first injection 3/5/21  Patient advised to call with any questions

## 2021-02-12 ENCOUNTER — OFFICE VISIT (OUTPATIENT)
Dept: GASTROENTEROLOGY | Facility: CLINIC | Age: 31
End: 2021-02-12
Payer: COMMERCIAL

## 2021-02-12 VITALS
SYSTOLIC BLOOD PRESSURE: 118 MMHG | HEART RATE: 69 BPM | BODY MASS INDEX: 41.07 KG/M2 | DIASTOLIC BLOOD PRESSURE: 80 MMHG | HEIGHT: 60 IN | WEIGHT: 209.2 LBS

## 2021-02-12 DIAGNOSIS — K50.813 CROHN'S DISEASE OF BOTH SMALL AND LARGE INTESTINE WITH FISTULA (HCC): Primary | ICD-10-CM

## 2021-02-12 PROCEDURE — 99214 OFFICE O/P EST MOD 30 MIN: CPT | Performed by: INTERNAL MEDICINE

## 2021-02-12 NOTE — PROGRESS NOTES
[facility]  Missael Lau 31 y o  female MRN: [id number]  Encounter: [id number]    ASSESSMENT AND PLAN:      1  Crohn's disease of both small and large intestine with fistula ([facility] )    Doing reasonably well on Stelara she has only received 1 dose her infusion  Recommend continuing  I would also continue her Lialda  I have ordered routine blood work to be done about 3-4 weeks from now  I asked her to contact us if she develops any symptoms in the interim  She is due for colonoscopy however given the recent exacerbation and the micro perforation on CT scan would like to delay this for at least 6-12 months  - Comprehensive metabolic panel; Future  - CBC; Future  - C-reactive protein; Future      Followup Appointment:   Six months  ______________________________________________________________________    Chief Complaint   Patient presents with    Medication Management     Stelara      HPI:  Patient doing reasonably well  No diarrhea no rectal bleeding no abdominal pain  Her chronic joint pain is actually slightly better  She received the in the Stelara infusion in the beginning of January  She has noticed some itching on her neck and shoulders but no rash      Historical Information   Past Medical History:   Diagnosis Date    Crohn's disease ([facility] )     RA (rheumatoid arthritis) ([facility] )     Rheumatoid arthritis ([facility] )      Past Surgical History:   Procedure Laterality Date    APPENDECTOMY      2010    BOWEL RESECTION      2014    MN REPAIR INCISIONAL HERNIA,REDUCIBLE N/A 4/26/2016    Procedure: OPEN INCISIONAL HERNIA REPAIR X4 W/ UMBILICAL HERNIA REPAIR WITH MESH;  Surgeon: Noemi Sky MD;  Location:  MAIN OR;  Service: General    SALPINGECTOMY Right     2011     Social History     Substance and Sexual Activity   Alcohol Use No     Social History     Substance and Sexual Activity   Drug Use No     Social History     Tobacco Use Smoking Status Never Smoker   Smokeless Tobacco Never Used     History reviewed  No pertinent family history  Current Outpatient Medications:     AVIANE 0 1-20 MG-MCG per tablet    mesalamine (LIALDA) 1 2 g EC tablet    ustekinumab (Stelara) 90 mg/mL subcutaneous injection    ondansetron (ZOFRAN) 4 mg tablet  Allergies   Allergen Reactions    Humira [Adalimumab] Other (See Comments)     Made psoriasis worsen     Reviewed medications and allergies and updated as indicated    PHYSICAL EXAM:    Blood pressure 118/80, pulse 69, height 5' (1 524 m), weight 94 9 kg (209 lb 3 2 oz), not currently breastfeeding  Body mass index is 40 86 kg/m²  General Appearance: NAD, cooperative, alert  Eyes: Anicteric, PERRLA, EOMI  ENT:  Normocephalic, atraumatic, normal mucosa  Neck:  Supple, symmetrical, trachea midline  Resp:  Clear to auscultation bilaterally; no rales, rhonchi or wheezing; respirations unlabored   CV:  S1 S2, Regular rate and rhythm; no murmur, rub, or gallop  GI:  Soft, non-tender, non-distended; normal bowel sounds; no masses, no organomegaly   Rectal: Deferred  Musculoskeletal: No cyanosis, clubbing or edema  Normal ROM  Skin:  No jaundice, rashes, or lesions   Heme/Lymph: No palpable cervical lymphadenopathy  Psych: Normal affect, good eye contact  Neuro: No gross deficits, AAOx3    Lab Results:   Lab Results   Component Value Date    WBC 16 27 (H) 05/30/2018    HGB 12 0 05/30/2018    HCT 36 8 05/30/2018    MCV 83 05/30/2018     (H) 05/30/2018     Lab Results   Component Value Date    K 3 8 05/30/2018    CL 98 (L) 05/30/2018    CO2 28 05/30/2018    BUN 10 05/30/2018    CREATININE 0 77 05/30/2018    CALCIUM 9 3 05/30/2018    AST 16 05/30/2018    ALT 25 05/30/2018    ALKPHOS 55 05/30/2018    EGFR 105 05/30/2018     No results found for: IRON, TIBC, FERRITIN  No results found for: LIPASE    Radiology Results:   No results found

## 2021-02-24 ENCOUNTER — TELEPHONE (OUTPATIENT)
Dept: GASTROENTEROLOGY | Facility: CLINIC | Age: 31
End: 2021-02-24

## 2021-02-24 NOTE — TELEPHONE ENCOUNTER
Amador Reardon from [address] [place] [address] requesting CB from [facility] about inquiry for this patient's Stelara and a pharmacy update   CB# [phone]

## 2021-02-24 NOTE — TELEPHONE ENCOUNTER
I spoke with Oumou Duff from [address] [facility], I provided a updated contact number as they were trying to reach her on her home phone  Her Stelara is ready to be shipped, they will reach out to patient at [phone]

## 2021-06-18 ENCOUNTER — TELEPHONE (OUTPATIENT)
Dept: GASTROENTEROLOGY | Facility: CLINIC | Age: 31
End: 2021-06-18

## 2021-06-18 NOTE — TELEPHONE ENCOUNTER
I spoke with patient, she reports [facility] told her that a PA is required as it  in May  I reached out to [facility] to find out how to process PA; needs to be marked Urgent as she is due 21  Per [facility] they can't ship until 21   I am awaiting [facility]'s response re: PA

## 2021-06-18 NOTE — TELEPHONE ENCOUNTER
Stelara approval received  UPNYVK:[id number];UIHEQD:[id number]; Review Type:Prior Auth; Coverage Start Date:05/19/2021; Coverage End Date:06/18/2022;  I reached out to [facility] to help set up delivery to ensure she would review on time

## 2021-06-18 NOTE — TELEPHONE ENCOUNTER
Pt called with concern over stelara Prior auth; the pharmacy told her they can not ship anything until 7/1; she is due for her next one 6/25  Was not aware PA was only good for 6 months   CB # [phone]

## 2021-06-22 ENCOUNTER — TELEPHONE (OUTPATIENT)
Dept: OBGYN CLINIC | Facility: CLINIC | Age: 31
End: 2021-06-22

## 2021-06-22 DIAGNOSIS — Z30.41 SURVEILLANCE FOR BIRTH CONTROL, ORAL CONTRACEPTIVES: Primary | ICD-10-CM

## 2021-06-22 RX ORDER — LEVONORGESTREL AND ETHINYL ESTRADIOL 0.1-0.02MG
1 KIT ORAL DAILY
Qty: 28 TABLET | Refills: 1 | Status: SHIPPED | OUTPATIENT
Start: 2021-06-22 | End: 2021-07-22 | Stop reason: SDUPTHER

## 2021-06-22 NOTE — TELEPHONE ENCOUNTER
Return call to [facility] exam scheduled for July  Requesting refill until visit  Rx previously requested by pharmacy and denied  she has been out x 3 days  Advised to use back up protection remainder of the month  Patient verbalized understanding  Zulma, please advise if refill can be provided

## 2021-06-22 NOTE — TELEPHONE ENCOUNTER
Informed Maday Ley of [facility] is aware of her several messages but currently has office hours  Informed Maday Ley her medication may not be renewed for 48 hours

## 2021-06-22 NOTE — TELEPHONE ENCOUNTER
Left several messages requesting refills, totally out, pharmacy [address] states script has  & will not honor the last 2 refills  Wellness is scheduled

## 2021-06-22 NOTE — TELEPHONE ENCOUNTER
Alejandrina Real called again requesting medication renewal  She does have [facility] scheduled for 07/22/2021

## 2021-07-06 ENCOUNTER — VBI (OUTPATIENT)
Dept: ADMINISTRATIVE | Facility: OTHER | Age: 31
End: 2021-07-06

## 2021-07-06 NOTE — TELEPHONE ENCOUNTER
Upon review of the In Basket request we were able to locate, review, and update the patient chart as requested for Pap Smear (HPV) aka Cervical Cancer Screening  Any additional questions or concerns should be emailed to the Practice Liaisons via [email or web address] email, please do not reply via In Basket      Thank you  Shad Hill

## 2021-07-22 ENCOUNTER — ANNUAL EXAM (OUTPATIENT)
Dept: OBGYN CLINIC | Facility: CLINIC | Age: 31
End: 2021-07-22
Payer: COMMERCIAL

## 2021-07-22 VITALS
BODY MASS INDEX: 42.72 KG/M2 | HEIGHT: 60 IN | WEIGHT: 217.6 LBS | SYSTOLIC BLOOD PRESSURE: 118 MMHG | DIASTOLIC BLOOD PRESSURE: 70 MMHG

## 2021-07-22 DIAGNOSIS — Z12.4 CERVICAL CANCER SCREENING: ICD-10-CM

## 2021-07-22 DIAGNOSIS — Z30.41 SURVEILLANCE FOR BIRTH CONTROL, ORAL CONTRACEPTIVES: ICD-10-CM

## 2021-07-22 DIAGNOSIS — B97.7 HPV IN FEMALE: ICD-10-CM

## 2021-07-22 DIAGNOSIS — Z01.419 ROUTINE GYNECOLOGICAL EXAMINATION: Primary | ICD-10-CM

## 2021-07-22 PROCEDURE — 99395 PREV VISIT EST AGE 18-39: CPT | Performed by: OBSTETRICS & GYNECOLOGY

## 2021-07-22 RX ORDER — ACETAMINOPHEN 325 MG/1
1-2 TABLET ORAL EVERY 6 HOURS PRN
COMMUNITY
End: 2022-07-12

## 2021-07-22 RX ORDER — LEVONORGESTREL AND ETHINYL ESTRADIOL 0.1-0.02MG
1 KIT ORAL DAILY
Qty: 90 TABLET | Refills: 3 | Status: SHIPPED | OUTPATIENT
Start: 2021-07-22 | End: 2022-07-12 | Stop reason: SDUPTHER

## 2021-07-22 NOTE — PATIENT INSTRUCTIONS
Pap every 5 years if normal, sexually transmitted infection testing as indicated, exercise most days of week, obtain appropriate diet and hydration, Calcium 1000mg + 600 vit D daily, birth control as directed (ACHES reviewed)  Benefits, risks and alternatives discussed/reviewed  HPV 9 vaccine recommended through age 39  Check with your insurance for coverage  If covered, call office to schedule start of vaccine series  Annual mammogram starting at age 36, monthly breast self exam  Inocente Jang 20 times twice daily

## 2021-07-22 NOTE — PROGRESS NOTES
54 Levine, Susan. \Hospital Has a New Name and Outlook.\"" [facility]    ASSESSMENT/PLAN: Nacho Castelan is a 31 y o  No obstetric history on file  who presents for annual gynecologic exam   Encounter for routine gynecologic examination  - Routine well woman exam completed today  - Cervical Cancer Screening: Current ASCCP Guidelines reviewed  Last Pap: 06/02/2020   Next Pap Due: today  - COVID vaccine  Maderna  4/2021   Counseled on   Gardasil to check w insurance  - STI screening offered including HIV testing: declined   - Contraceptive counseling discussed  Current contraception: condoms or combination OCPs:   - Breast Cancer Screening: Last Mammogram Not on file na  At age  35    Additional problems addressed during this visit:  There are no diagnoses linked to this encounter  CC:  Annual Gynecologic Examination    HPI: Nacho Castelan is a 31 y o  No obstetric history on file  who presents for annual gynecologic examination  + hx of  HPV   Menarche at  14years of age  Cycles  Every  28-30  On  OCP  No missed pills   Denies  ACHES and BTB  No  BTB   BMI  42  New medication w  [facility]     + sexually active   Same partner       The following portions of the patient's history were reviewed and updated as appropriate: She  has a past medical history of Chronic PID (chronic pelvic inflammatory disease), Crohn's disease ([address] ), Obesity, Papanicolaou smear (2020), Pseudotumor, RA (rheumatoid arthritis) ([address] ), Rheumatoid arthritis ([address] ), and Ulcerative colitis ([address] )  She  has a past surgical history that includes Salpingectomy (Right); Appendectomy; Bowel resection; pr repair incisional hernia,reducible (N/A, 4/26/2016); Colonoscopy (2016); DXA procedure(historical) (2019); and Colposcopy (2019)  Her family history is not on file  She  reports that she has never smoked  She has never used smokeless tobacco  She reports current alcohol use  She reports current drug use   Drug: Marijuana  Current Outpatient Medications   Medication Sig Dispense Refill    acetaminophen (Tylenol) 325 mg tablet Take 1-2 tablets by mouth every 6 (six) hours as needed      mesalamine (LIALDA) 1 2 g EC tablet Take 2 tablets (2 4 g total) by mouth daily with breakfast 60 tablet 5    ustekinumab (Stelara) 90 mg/mL subcutaneous injection Inject 1 mL (90 mg total) under the skin every 56 days 1 mL 6    levonorgestrel-ethinyl estradiol (Aviane) 0 1-20 MG-MCG per tablet Take 1 tablet by mouth daily for 28 days 28 tablet 1    ondansetron (ZOFRAN) 4 mg tablet Take 1 tablet (4 mg total) by mouth every 8 (eight) hours as needed for nausea or vomiting (Patient not taking: Reported on 2/12/2021) 20 tablet 0     No current facility-administered medications for this visit  She is allergic to humira [adalimumab]       Review of Systems   Constitutional: Negative for chills and fever  HENT: Negative for ear pain and sore throat  Eyes: Negative for pain and visual disturbance  Respiratory: Negative for cough and shortness of breath  Cardiovascular: Negative for chest pain and palpitations  Gastrointestinal: Negative for abdominal pain and vomiting  Genitourinary: Negative for dysuria and hematuria  Musculoskeletal: Negative for arthralgias and back pain  Skin: Negative for color change and rash  Neurological: Negative for seizures and syncope  All other systems reviewed and are negative  Objective:  /70   Ht 4' 11 5" (1 511 m)   Wt 98 7 kg (217 lb 9 6 oz)   LMP 07/17/2021 (Exact Date)   Breastfeeding No   BMI 43 21 kg/m²    Physical Exam  Vitals and nursing note reviewed  Constitutional:       Appearance: Normal appearance  HENT:      Head: Normocephalic  Cardiovascular:      Rate and Rhythm: Normal rate and regular rhythm  Pulmonary:      Effort: Pulmonary effort is normal       Breath sounds: Normal breath sounds     Chest:      Chest wall: No mass, lacerations, swelling, tenderness or edema  Breasts: Pj Score is 4  Breasts are symmetrical          Right: Normal  No swelling, bleeding, inverted nipple, mass, nipple discharge, skin change or tenderness  Left: No swelling, bleeding, inverted nipple, mass, nipple discharge, skin change or tenderness  Abdominal:      General: Abdomen is flat  Bowel sounds are normal       Palpations: Abdomen is soft  Genitourinary:     General: Normal vulva  Exam position: Lithotomy position  Pubic Area: No rash  Pj stage (genital): 4       Labia:         Right: No rash, tenderness or lesion  Left: No rash, tenderness or lesion  Urethra: No urethral pain, urethral swelling or urethral lesion  Vagina: Normal       Cervix: No cervical motion tenderness or discharge  Uterus: Normal        Adnexa: Right adnexa normal and left adnexa normal       Rectum: Normal    Musculoskeletal:         General: Normal range of motion  Cervical back: Neck supple  Lymphadenopathy:      Upper Body:      Right upper body: No supraclavicular, axillary or pectoral adenopathy  Left upper body: No supraclavicular, axillary or pectoral adenopathy  Lower Body: No right inguinal adenopathy  No left inguinal adenopathy  Skin:     General: Skin is warm and dry  Neurological:      Mental Status: She is alert     Psychiatric:         Mood and Affect: Mood normal

## 2021-07-25 LAB
CYTOLOGIST CVX/VAG CYTO: NORMAL
DX ICD CODE: NORMAL
HPV I/H RISK 4 DNA CVX QL PROBE+SIG AMP: NEGATIVE
OTHER STN SPEC: NORMAL
PATH REPORT.FINAL DX SPEC: NORMAL
SL AMB NOTE:: NORMAL
SL AMB SPECIMEN ADEQUACY: NORMAL
SL AMB TEST METHODOLOGY: NORMAL

## 2021-08-12 ENCOUNTER — TELEPHONE (OUTPATIENT)
Dept: GASTROENTEROLOGY | Facility: CLINIC | Age: 31
End: 2021-08-12

## 2021-08-12 NOTE — TELEPHONE ENCOUNTER
Pt states there is a problem w/ Stelara prior auth - Ins changed from [facility] to [facility]  She rec'd confirmation from someone in this office that PA was confirmed  She is due for Stelara next Fri and usually gets med 2 days prior, she just spoke w/ someone who told her that it needs a prior [facility]# [phone]     (I am not sure if person she spoke w/ was at [employer] or pharmacy)

## 2021-08-12 NOTE — TELEPHONE ENCOUNTER
Spoke with patient obtained new prescription insurance information  ID# [id number]  KXM:[id number]  [facility]:[id number]    New PA submitted via Cover My Meds for Stelara 90 mg/ml every 8 weeks   Patient's next injection is 8/20/21

## 2021-08-17 NOTE — TELEPHONE ENCOUNTER
Spoke with Ramin Akbar at [phone] additional clinical questions  Need to fax office notes regarding remission status to [phone]  Records faxed  Per [phone] Sanjana Wilks since we need answer ASAP  She states can take 24 hours  Advised calling tomorrow to request immediate clinical review if we don't get answer today  We would call same # as above

## 2021-08-17 NOTE — TELEPHONE ENCOUNTER
[facility] sent faxed form to complete    Completed and faxed with OV note to [phone]

## 2021-08-18 ENCOUNTER — TELEPHONE (OUTPATIENT)
Dept: OBGYN CLINIC | Facility: CLINIC | Age: 31
End: 2021-08-18

## 2021-08-18 NOTE — TELEPHONE ENCOUNTER
Pt called informing she has been on OCP Aviane and when she went to  her prescription it was a brand called Sam  Pt is questioning why script was changed  Reviewed ECW: pt was seen on 7/22/21 and an order for her birth control (Marily Crome) was placed

## 2021-08-18 NOTE — TELEPHONE ENCOUNTER
Spoke with pt and informed Rx sent on 7/22/21 for for the brand Aviane  Pt reports she had been getting gher script through [facility] [facility]  [facility] spoke with [facility]ist and and [facility] not [facility] and he was new and was unsure how to handle process  Pt would like to stay on Avianne, reports she has tried other birth control in the past that have interacted with her other medications  Advised to reach back out to the pharmacy regarding ordering preferred OCP and return 90 day supply of [place] supplied, go back to using [address] or find a local pharmacy that carries preferred (Aviane) brand  If pt needs further assistance to call back

## 2021-09-27 ENCOUNTER — OFFICE VISIT (OUTPATIENT)
Dept: GASTROENTEROLOGY | Facility: CLINIC | Age: 31
End: 2021-09-27
Payer: COMMERCIAL

## 2021-09-27 VITALS
HEIGHT: 60 IN | BODY MASS INDEX: 43 KG/M2 | DIASTOLIC BLOOD PRESSURE: 82 MMHG | WEIGHT: 219 LBS | SYSTOLIC BLOOD PRESSURE: 124 MMHG

## 2021-09-27 DIAGNOSIS — K50.813 CROHN'S DISEASE OF BOTH SMALL AND LARGE INTESTINE WITH FISTULA (HCC): Primary | ICD-10-CM

## 2021-09-27 DIAGNOSIS — R10.32 LEFT LOWER QUADRANT ABDOMINAL PAIN: Primary | ICD-10-CM

## 2021-09-27 PROCEDURE — 99214 OFFICE O/P EST MOD 30 MIN: CPT | Performed by: INTERNAL MEDICINE

## 2021-09-27 RX ORDER — MESALAMINE 1.2 G/1
2400 TABLET, DELAYED RELEASE ORAL
Qty: 60 TABLET | Refills: 5 | Status: SHIPPED | OUTPATIENT
Start: 2021-09-27 | End: 2022-08-02

## 2021-09-27 NOTE — PROGRESS NOTES
[facility] - Outpatient Follow-up Note  Rocío Renee 31 y o  female MRN: [id number]  Encounter: [id number]    ASSESSMENT AND PLAN:      1  Crohn's disease of both small and large intestine with fistula ([facility] )  Generally okay on Stelara  Increased frequency of loose stools recently  This may be due to her discontinuation of Lialda  It is also possible that she is developing resistance to Stelara or perhaps needs a higher dose  I would like to check antibody and drug levels before her next injection  I reordered her Lialda  She is to continue the Stelara for now  She is due for colonoscopy which we deferred since November due to a spontaneous micro perforation  Plan to schedule this in November or December  - mesalamine (LIALDA) 1 2 g EC tablet; Take 2 tablets (2 4 g total) by mouth daily with breakfast  Dispense: 60 tablet; Refill: 5      Followup Appointment:  1 year  ______________________________________________________________________    Chief Complaint   Patient presents with    Follow up bowel performation    Crohn's Disease     flare up     HPI:  Has been doing reasonably well on Stelara  She takes it every 56 days  She does notice some increase loose stool and swelling in her joints prior to the injection  Over the last month or 2 has been having increased frequency of stools without blood  She stopped her Lialda because she ran out of it  No other new medical problems no side effects of the Stelara  Historical Information   Past Medical History:   Diagnosis Date    Chronic PID (chronic pelvic inflammatory disease)     Cro[facility]'s disease ([facility] )     Obesity     Papanicolaou smear 2020    Pseudotumor     Bilat       RA (rheumatoid arthritis) ([facility] )     Rheumatoid arthritis ([facility] )     Ulcerative colitis ([facility] )      Past Surgical History:   Procedure Laterality Date    APPENDECTOMY      2010    BOWEL RESECTION      2014    COLONOSCOPY  2016    COLPOSCOPY  2019    DXA PROCEDURE [facility]OR[facility])  [facility]    [facility]    UT REPAIR INCISIONAL HERNIA,REDUCIBLE N/A 4/26/2016    Procedure: OPEN INCISIONAL HERNIA REPAIR X4 W/ UMBILICAL HERNIA REPAIR WITH MESH;  Surgeon: Chema Brooks MD;  Location: [facility];  Service: General    SALPINGECTOMY Right     2011     Social History     Substance and Sexual Activity   Alcohol Use Yes    Comment: rare     Social History     Substance and Sexual Activity   Drug Use Yes    Types: Marijuana    Comment: Hx of Marijuana use, per legacy chart hx  Social History     Tobacco Use   Smoking Status Never Smoker   Smokeless Tobacco Never Used     Family History   Problem Relation Age of Onset    Breast cancer Neg Hx     Uterine cancer Neg Hx     Ovarian cancer Neg Hx     Colon cancer Neg Hx          Current Outpatient Medications:     acetaminophen (Tylenol) 325 mg tablet    levonorgestrel-ethinyl estradiol (Aviane) 0 1-20 MG-MCG per tablet    ustekinumab (Stelara) 90 mg/mL subcutaneous injection    mesalamine (LIALDA) 1 2 g EC tablet    ondansetron (ZOFRAN) 4 mg tablet  Allergies   Allergen Reactions    Humira [Adalimumab] Other (See Comments)     Made psoriasis worsen     Reviewed medications and allergies and updated as indicated    PHYSICAL EXAM:    Blood pressure 124/82, height 5' (1 524 m), weight 99 3 kg (219 lb), not currently breastfeeding  Body mass index is 42 77 kg/m²  General Appearance: NAD, cooperative, alert  Eyes: Anicteric, PERRLA, EOMI  ENT:  Normocephalic, atraumatic, normal mucosa  Neck:  Supple, symmetrical, trachea midline  Resp:  Clear to auscultation bilaterally; no rales, rhonchi or wheezing; respirations unlabored   CV:  S1 S2, Regular rate and rhythm; no murmur, rub, or gallop  GI:  Soft, non-tender, non-distended; normal bowel sounds; no masses, no organomegaly   Rectal: Deferred  Musculoskeletal: No cyanosis, clubbing or edema  Normal ROM    Skin:  No jaundice, rashes, or lesions Heme/Lymph: No palpable cervical lymphadenopathy  Psych: Normal affect, good eye contact  Neuro: No gross deficits, AAOx3    Lab Results:   Lab Results   Component Value Date    WBC 16 27 (H) 05/30/2018    HGB 12 0 05/30/2018    HCT 36 8 05/30/2018    MCV 83 05/30/2018     (H) 05/30/2018     Lab Results   Component Value Date    K 3 8 05/30/2018    CL 98 (L) 05/30/2018    CO2 28 05/30/2018    BUN 10 05/30/2018    CREATININE 0 77 05/30/2018    CALCIUM 9 3 05/30/2018    AST 16 05/30/2018    ALT 25 05/30/2018    ALKPHOS 55 05/30/2018    EGFR 105 05/30/2018     No results found for: IRON, TIBC, FERRITIN  No results found for: LIPASE    Radiology Results:   No results found

## 2021-09-27 NOTE — Clinical Note
Hi Tiff, Zoë needs Stelara levels and antibodies  I do not see an order for this when I type in the name of the drug  Can you order it before her next injection?   Thanks

## 2021-09-29 RX ORDER — SODIUM PICOSULFATE, MAGNESIUM OXIDE, AND ANHYDROUS CITRIC ACID 10; 3.5; 12 MG/160ML; G/160ML; G/160ML
LIQUID ORAL
Qty: 320 ML | Refills: 0 | Status: SHIPPED | OUTPATIENT
Start: 2021-09-29 | End: 2022-06-07 | Stop reason: ALTCHOICE

## 2021-11-11 LAB
USTEKINUMAB AB SERPL IA-MCNC: <40 NG/ML
USTEKINUMAB SERPL IA-MCNC: 7.6 UG/ML

## 2021-11-13 ENCOUNTER — ANESTHESIA EVENT (OUTPATIENT)
Dept: GASTROENTEROLOGY | Facility: AMBULATORY SURGERY CENTER | Age: 31
End: 2021-11-13

## 2021-11-15 ENCOUNTER — ANESTHESIA (OUTPATIENT)
Dept: GASTROENTEROLOGY | Facility: AMBULATORY SURGERY CENTER | Age: 31
End: 2021-11-15

## 2021-11-15 ENCOUNTER — HOSPITAL ENCOUNTER (OUTPATIENT)
Dept: GASTROENTEROLOGY | Facility: AMBULATORY SURGERY CENTER | Age: 31
Discharge: HOME | End: 2021-11-15
Payer: COMMERCIAL

## 2021-11-15 VITALS
WEIGHT: 215 LBS | TEMPERATURE: 98.6 F | DIASTOLIC BLOOD PRESSURE: 84 MMHG | RESPIRATION RATE: 18 BRPM | BODY MASS INDEX: 41.99 KG/M2 | HEART RATE: 72 BPM | OXYGEN SATURATION: 100 % | SYSTOLIC BLOOD PRESSURE: 157 MMHG

## 2021-11-15 DIAGNOSIS — K50.813 CROHN'S DISEASE OF BOTH SMALL AND LARGE INTESTINE WITH FISTULA (HCC): ICD-10-CM

## 2021-11-15 LAB
EXT PREGNANCY TEST URINE: NEGATIVE
EXT. CONTROL: NORMAL

## 2021-11-15 PROCEDURE — 45380 COLONOSCOPY AND BIOPSY: CPT | Performed by: INTERNAL MEDICINE

## 2021-11-15 PROCEDURE — 88305 TISSUE EXAM BY PATHOLOGIST: CPT | Performed by: PATHOLOGY

## 2021-11-15 RX ORDER — SODIUM CHLORIDE, SODIUM LACTATE, POTASSIUM CHLORIDE, CALCIUM CHLORIDE 600; 310; 30; 20 MG/100ML; MG/100ML; MG/100ML; MG/100ML
50 INJECTION, SOLUTION INTRAVENOUS CONTINUOUS
Status: DISCONTINUED | OUTPATIENT
Start: 2021-11-15 | End: 2021-11-19 | Stop reason: HOSPADM

## 2021-11-15 RX ORDER — PROPOFOL 10 MG/ML
INJECTION, EMULSION INTRAVENOUS AS NEEDED
Status: DISCONTINUED | OUTPATIENT
Start: 2021-11-15 | End: 2021-11-15

## 2021-11-15 RX ORDER — LIDOCAINE HYDROCHLORIDE 10 MG/ML
INJECTION, SOLUTION EPIDURAL; INFILTRATION; INTRACAUDAL; PERINEURAL AS NEEDED
Status: DISCONTINUED | OUTPATIENT
Start: 2021-11-15 | End: 2021-11-15

## 2021-11-15 RX ADMIN — PROPOFOL 100 MG: 10 INJECTION, EMULSION INTRAVENOUS at 07:32

## 2021-11-15 RX ADMIN — SODIUM CHLORIDE, SODIUM LACTATE, POTASSIUM CHLORIDE, CALCIUM CHLORIDE 50 ML/HR: 600; 310; 30; 20 INJECTION, SOLUTION INTRAVENOUS at 07:17

## 2021-11-15 RX ADMIN — PROPOFOL 60 MG: 10 INJECTION, EMULSION INTRAVENOUS at 07:42

## 2021-11-15 RX ADMIN — PROPOFOL 40 MG: 10 INJECTION, EMULSION INTRAVENOUS at 07:38

## 2021-11-15 RX ADMIN — PROPOFOL 40 MG: 10 INJECTION, EMULSION INTRAVENOUS at 07:48

## 2021-11-15 RX ADMIN — LIDOCAINE HYDROCHLORIDE 50 MG: 10 INJECTION, SOLUTION EPIDURAL; INFILTRATION; INTRACAUDAL; PERINEURAL at 07:32

## 2021-11-15 RX ADMIN — PROPOFOL 40 MG: 10 INJECTION, EMULSION INTRAVENOUS at 07:36

## 2022-01-31 ENCOUNTER — TELEPHONE (OUTPATIENT)
Dept: OBGYN CLINIC | Facility: CLINIC | Age: 32
End: 2022-01-31

## 2022-01-31 NOTE — TELEPHONE ENCOUNTER
Arturo Rivera l/m she had transferred her avianne rx from [facility] to [facility]  [facility] to [facility]  She is going to go back to [facility] and would like new rx sent to [address]  L/JOSE on patient v/m to have [facility] call [facility] to transfer refills back to [facility]  C/B if requires further assistance or to schedule well annual due in July

## 2022-03-07 DIAGNOSIS — K50.813 CROHN'S DISEASE OF BOTH SMALL AND LARGE INTESTINE WITH FISTULA (HCC): ICD-10-CM

## 2022-03-10 RX ORDER — USTEKINUMAB 90 MG/ML
INJECTION, SOLUTION SUBCUTANEOUS
Qty: 1 ML | Refills: 2 | Status: SHIPPED | OUTPATIENT
Start: 2022-03-10 | End: 2022-06-07 | Stop reason: SDUPTHER

## 2022-03-17 ENCOUNTER — TELEPHONE (OUTPATIENT)
Dept: GASTROENTEROLOGY | Facility: CLINIC | Age: 32
End: 2022-03-17

## 2022-03-17 DIAGNOSIS — Z11.1 SCREENING-PULMONARY TB: ICD-10-CM

## 2022-03-17 DIAGNOSIS — Z11.59 NEED FOR HEPATITIS B SCREENING TEST: ICD-10-CM

## 2022-03-17 DIAGNOSIS — K50.813 CROHN'S DISEASE OF BOTH SMALL AND LARGE INTESTINE WITH FISTULA (HCC): Primary | ICD-10-CM

## 2022-03-17 NOTE — TELEPHONE ENCOUNTER
Complete Medication Order:   Stelara 90 MG/ML subcutaneous injection INJECT 1 SYRINGE SUBCUTANEOUSLY EVERY 56 DAYS  Prior auth submitted (medical insurance/pharmacy plan)to:  Date Submitted:8/12/21  How prior auth submitted ([facility]) [place] submitted via Cover My Meds for Stelara 90 mg/ml every 8 weeks  [address] Number: No Auth number provided  Date Range of [place] that fills med:[facility] [address]   [address], [facility] [address]   Phone:  [phone]  Fax:  [phone]     Self-injectable: yes  Last TB Gold date, recall in place?: Last performed 10/10/20, recall in for May 2022? Placing order now 4/14/22  Last Hep B Surface Antigen date, recall in place?:Placing order now 4/14/22    Last OV Date/Provider: Office visit 9/27/22 Dr Bhumika Deleon Procedure (Colon) 11/15/21  Next OV expected/scheduled date/provider:  Recall in for August 2022    Last Drug level/Drug Antibody Level date:11/3/2021  Biologic History:Stopped Humira in 2013 Humira [Adalimumab]   Made psoriasis worsen      Crohn disease diagnosed 05/2013    On 03/24/2014, the patient underwent a  laparoscopic ileocolic resection and transection of the ileosigmoid  fistula       Prescription on medication list in Epic (confirmed 4/14/22)    Updated Biologic Tracker: 4/14/22 [facility]

## 2022-04-14 NOTE — TELEPHONE ENCOUNTER
Patient is over due for her Quant Tb Gold, Hep B surface Ag along with annual crohn's labs  Labs ordered for [facility], sent electronically  I sent her a portal message regarding blood work that is due

## 2022-06-07 DIAGNOSIS — K50.813 CROHN'S DISEASE OF BOTH SMALL AND LARGE INTESTINE WITH FISTULA (HCC): ICD-10-CM

## 2022-06-07 RX ORDER — USTEKINUMAB 90 MG/ML
INJECTION, SOLUTION SUBCUTANEOUS
Qty: 1 ML | Refills: 6 | Status: SHIPPED | OUTPATIENT
Start: 2022-06-07

## 2022-06-07 NOTE — TELEPHONE ENCOUNTER
I spoke with patient, she is scheduled to see Dr Lance Carlos 8/31/22 4 pm for her annual visit, she plans to have labs drawn prior  She confirmed she received the orders in the mail  Insurance information is up to date

## 2022-06-17 LAB
CREAT ?TM UR-SCNC: 39.7 UMOL/L
EXT ALBUMIN URINE RANDOM: 3
HBA1C MFR BLD HPLC: 5.1 %
MICROALBUMIN/CREAT UR: 8 MG/G{CREAT}

## 2022-06-21 ENCOUNTER — APPOINTMENT (OUTPATIENT)
Dept: RADIOLOGY | Facility: CLINIC | Age: 32
End: 2022-06-21
Payer: COMMERCIAL

## 2022-06-21 DIAGNOSIS — Z87.11 PERSONAL HISTORY OF PEPTIC ULCER DISEASE: ICD-10-CM

## 2022-06-21 DIAGNOSIS — E55.9 VITAMIN D DEFICIENCY, UNSPECIFIED: ICD-10-CM

## 2022-06-21 DIAGNOSIS — M25.569 KNEE PAIN, UNSPECIFIED CHRONICITY, UNSPECIFIED LATERALITY: ICD-10-CM

## 2022-06-21 DIAGNOSIS — M54.9 DORSALGIA: ICD-10-CM

## 2022-06-21 DIAGNOSIS — M06.9 RHEUMATOID ARTHRITIS, INVOLVING UNSPECIFIED SITE, UNSPECIFIED WHETHER RHEUMATOID FACTOR PRESENT (HCC): ICD-10-CM

## 2022-06-21 DIAGNOSIS — R51.9 NONINTRACTABLE HEADACHE, UNSPECIFIED CHRONICITY PATTERN, UNSPECIFIED HEADACHE TYPE: ICD-10-CM

## 2022-06-21 DIAGNOSIS — L40.9 PSORIASIS, UNSPECIFIED: ICD-10-CM

## 2022-06-21 DIAGNOSIS — K50.919 CROHN'S DISEASE WITH COMPLICATION, UNSPECIFIED GASTROINTESTINAL TRACT LOCATION (HCC): ICD-10-CM

## 2022-06-21 PROCEDURE — 72110 X-RAY EXAM L-2 SPINE 4/>VWS: CPT

## 2022-06-21 PROCEDURE — 71046 X-RAY EXAM CHEST 2 VIEWS: CPT

## 2022-07-12 ENCOUNTER — TELEPHONE (OUTPATIENT)
Dept: OBGYN CLINIC | Facility: CLINIC | Age: 32
End: 2022-07-12

## 2022-07-12 DIAGNOSIS — Z30.41 SURVEILLANCE FOR BIRTH CONTROL, ORAL CONTRACEPTIVES: ICD-10-CM

## 2022-07-12 RX ORDER — LEVONORGESTREL AND ETHINYL ESTRADIOL 0.1-0.02MG
1 KIT ORAL DAILY
Qty: 90 TABLET | Refills: 0 | Status: SHIPPED | OUTPATIENT
Start: 2022-07-12 | End: 2022-07-12 | Stop reason: SDUPTHER

## 2022-07-12 RX ORDER — LEVONORGESTREL AND ETHINYL ESTRADIOL 0.1-0.02MG
1 KIT ORAL DAILY
Qty: 90 TABLET | Refills: 0 | Status: SHIPPED | OUTPATIENT
Start: 2022-07-12 | End: 2022-08-02 | Stop reason: SDUPTHER

## 2022-07-12 NOTE — TELEPHONE ENCOUNTER
Pt l/m requesting one refill of her OCP to cover her until upcoming appointment 08/02/22 to her pharmacy on file [facility] in [place] please address

## 2022-08-02 ENCOUNTER — ANNUAL EXAM (OUTPATIENT)
Dept: OBGYN CLINIC | Facility: CLINIC | Age: 32
End: 2022-08-02
Payer: COMMERCIAL

## 2022-08-02 ENCOUNTER — TELEPHONE (OUTPATIENT)
Dept: GASTROENTEROLOGY | Facility: CLINIC | Age: 32
End: 2022-08-02

## 2022-08-02 VITALS
WEIGHT: 219 LBS | DIASTOLIC BLOOD PRESSURE: 74 MMHG | BODY MASS INDEX: 43 KG/M2 | SYSTOLIC BLOOD PRESSURE: 128 MMHG | HEIGHT: 60 IN

## 2022-08-02 DIAGNOSIS — Z30.41 SURVEILLANCE FOR BIRTH CONTROL, ORAL CONTRACEPTIVES: ICD-10-CM

## 2022-08-02 DIAGNOSIS — Z01.419 ROUTINE GYNECOLOGICAL EXAMINATION: Primary | ICD-10-CM

## 2022-08-02 DIAGNOSIS — B97.7 HPV IN FEMALE: ICD-10-CM

## 2022-08-02 DIAGNOSIS — Z12.4 CERVICAL CANCER SCREENING: ICD-10-CM

## 2022-08-02 PROCEDURE — 99395 PREV VISIT EST AGE 18-39: CPT | Performed by: OBSTETRICS & GYNECOLOGY

## 2022-08-02 PROCEDURE — 0503F POSTPARTUM CARE VISIT: CPT | Performed by: OBSTETRICS & GYNECOLOGY

## 2022-08-02 RX ORDER — LEVONORGESTREL AND ETHINYL ESTRADIOL 0.1-0.02MG
1 KIT ORAL DAILY
Qty: 90 TABLET | Refills: 3 | Status: SHIPPED | OUTPATIENT
Start: 2022-08-02 | End: 2023-08-02

## 2022-08-02 NOTE — TELEPHONE ENCOUNTER
Received faxed form from [address] [place] [address] for Stelara    Completed and faxed back to [phone]

## 2022-08-02 NOTE — PROGRESS NOTES
[address] [place] [address], [address]    ASSESSMENT/PLAN: Murali Gibson is a 27 y o  No obstetric history on file  who presents for annual gynecologic exam     Encounter for routine gynecologic examination  - Routine well woman exam completed today  - HPV Vaccination status: not done  Can  Schedule    - STI screening offered including HIV testing: declined   - Contraceptive counseling discussed  Current contraception: condoms or combination OCPs:     Additional problems addressed during this visit:  1  Routine gynecological examination    2  HPV in female    3  Cervical cancer screening    4  BMI 40 0-44 9, adult (HCC)        CC: Annual Gynecologic Examination    HPI: Murali Gibson is a 27 y o  No obstetric history on file  who presents for annual gynecologic examination  28 yo here ofr  Wellness exam   On ocp denies  ACHES  And  BTB  Same partner   No change in vaginal dc itchingor  Burning  Initial BP elevated    Pt  Given precautions       The following portions of the patient's history were reviewed and updated as appropriate: She  has a past medical history of Chronic PID (chronic pelvic inflammatory disease), Crohn's disease ([address] ), Obesity, Papanicolaou smear (2020), Pseudotumor, RA (rheumatoid arthritis) ([address] ), Rheumatoid arthritis ([address] ), and Ulcerative colitis ([address] )  She  has a past surgical history that includes Salpingectomy (Right); Appendectomy; Bowel resection; pr repair incisional hernia,reducible (N/A, 4/26/2016); Colonoscopy (2016); DXA procedure(historical) (2019); Colposcopy (2019); Colonoscopy; Colon surgery; and Hernia repair  Her family history is not on file  She  reports that she has never smoked  She has never used smokeless tobacco  She reports current alcohol use  She reports current drug use  Drug: Marijuana    Current Outpatient Medications   Medication Sig Dispense Refill    levonorgestrel-ethinyl estradiol (Aviane) 0 1-20 MG-MCG per tablet Take 1 tablet by mouth daily 90 tablet 0    ustekinumab (Stelara) 90 mg/mL subcutaneous injection Inject (1) pre-filled syringe subcutaneously every 56 days 1 mL 6     No current facility-administered medications for this visit  She is allergic to humira [adalimumab]       Review of Systems   Constitutional: Negative for chills and fever  HENT: Negative for ear pain and sore throat  Eyes: Negative for pain and visual disturbance  Respiratory: Negative for cough and shortness of breath  Cardiovascular: Negative for chest pain and palpitations  Gastrointestinal: Negative for abdominal pain and vomiting  Genitourinary: Negative for dysuria and hematuria  Musculoskeletal: Negative for arthralgias and back pain  Skin: Negative for color change and rash  Neurological: Negative for seizures and syncope  Hematological: Negative  Psychiatric/Behavioral: Negative  All other systems reviewed and are negative  Objective: There were no vitals taken for this visit  Physical Exam  Vitals and nursing note reviewed  Constitutional:       Appearance: Normal appearance  HENT:      Head: Normocephalic  Cardiovascular:      Rate and Rhythm: Normal rate and regular rhythm  Pulses: Normal pulses  Heart sounds: Normal heart sounds  Pulmonary:      Effort: Pulmonary effort is normal       Breath sounds: Normal breath sounds  Chest:      Chest wall: No mass, lacerations, swelling, tenderness or edema  Breasts: Pj Score is 4  Breasts are symmetrical       Right: Normal  No swelling, bleeding, inverted nipple, mass, nipple discharge, skin change, tenderness, axillary adenopathy or supraclavicular adenopathy  Left: No swelling, bleeding, inverted nipple, mass, nipple discharge, skin change, tenderness, axillary adenopathy or supraclavicular adenopathy  Abdominal:      General: Abdomen is flat   Bowel sounds are normal       Palpations: Abdomen is soft  Genitourinary:     General: Normal vulva  Exam position: Lithotomy position  Pubic Area: No rash  Pj stage (genital): 4       Labia:         Right: No rash, tenderness or lesion  Left: No rash, tenderness or lesion  Urethra: No urethral pain, urethral swelling or urethral lesion  Vagina: Normal       Cervix: No cervical motion tenderness or discharge  Uterus: Normal        Adnexa: Right adnexa normal and left adnexa normal       Rectum: Normal    Musculoskeletal:         General: Normal range of motion  Cervical back: Neck supple  Lymphadenopathy:      Upper Body:      Right upper body: No supraclavicular, axillary or pectoral adenopathy  Left upper body: No supraclavicular, axillary or pectoral adenopathy  Lower Body: No right inguinal adenopathy  No left inguinal adenopathy  Skin:     General: Skin is warm and dry  Neurological:      General: No focal deficit present  Mental Status: She is alert and oriented to person, place, and time  Psychiatric:         Mood and Affect: Mood normal          Behavior: Behavior normal          Thought Content:  Thought content normal

## 2022-08-02 NOTE — PATIENT INSTRUCTIONS
Pap every 3 years if normal, STI testing as indicated, exercise most days of week, obtain appropriate diet and hydration, Calcium 1000mg + 600 vit D daily, birth control as directed (ACHES reviewed)  Benefits, risks and alternatives discussed/reviewed  Condom use when sexually active for sexually transmitted infection prevention  HPV 9 vaccine recommended through age 39  Check with your insurance for coverage  If covered, call office to schedule start of vaccine series  Annual mammogram starting at age 36, monthly breast self exam  Lashawn Carcamo   at red light or at least  20 times a day

## 2022-08-05 LAB
CLINICAL INFO: NORMAL
CYTO CVX: NORMAL
CYTOLOGY CMNT CVX/VAG CYTO-IMP: NORMAL
DATE PREVIOUS BX: NORMAL
LMP START DATE: NORMAL
SL AMB PREV. PAP:: NORMAL
SPECIMEN SOURCE CVX/VAG CYTO: NORMAL

## 2022-08-08 NOTE — TELEPHONE ENCOUNTER
[facility] Management fax  [place] approved  8/5/2022 - 8/5/2023  PA# [employer] Standard Plan [id number]    Also MyChart reminder to patient to get labs done prior to scheduled OV with WO at end of August 2022

## 2022-08-08 NOTE — TELEPHONE ENCOUNTER
[facility] Management fax  [place] approved    8/5/2022 - 8/5/2023  PA# [employer] Standard Plan [id number] [facility]

## 2022-08-17 ENCOUNTER — APPOINTMENT (EMERGENCY)
Dept: RADIOLOGY | Facility: HOSPITAL | Age: 32
End: 2022-08-17
Payer: COMMERCIAL

## 2022-08-17 ENCOUNTER — HOSPITAL ENCOUNTER (EMERGENCY)
Facility: HOSPITAL | Age: 32
Discharge: HOME | End: 2022-08-17
Attending: EMERGENCY MEDICINE
Payer: COMMERCIAL

## 2022-08-17 ENCOUNTER — APPOINTMENT (OUTPATIENT)
Dept: RADIOLOGY | Facility: HOSPITAL | Age: 32
End: 2022-08-17
Payer: COMMERCIAL

## 2022-08-17 VITALS
TEMPERATURE: 98.4 F | HEART RATE: 80 BPM | SYSTOLIC BLOOD PRESSURE: 146 MMHG | DIASTOLIC BLOOD PRESSURE: 87 MMHG | RESPIRATION RATE: 20 BRPM | OXYGEN SATURATION: 99 %

## 2022-08-17 DIAGNOSIS — R07.9 CHEST PAIN, UNSPECIFIED: Primary | ICD-10-CM

## 2022-08-17 LAB
ANION GAP SERPL CALCULATED.3IONS-SCNC: 7 MMOL/L (ref 4–13)
BASOPHILS # BLD AUTO: 0.06 THOUSANDS/ΜL (ref 0–0.1)
BASOPHILS NFR BLD AUTO: 1 % (ref 0–1)
BUN SERPL-MCNC: 16 MG/DL (ref 5–25)
CALCIUM SERPL-MCNC: 9.8 MG/DL (ref 8.3–10.1)
CARDIAC TROPONIN I PNL SERPL HS: <2 NG/L
CHLORIDE SERPL-SCNC: 105 MMOL/L (ref 96–108)
CO2 SERPL-SCNC: 25 MMOL/L (ref 21–32)
CREAT SERPL-MCNC: 0.92 MG/DL (ref 0.6–1.3)
D DIMER PPP FEU-MCNC: 1.19 UG/ML FEU
EOSINOPHIL # BLD AUTO: 0.12 THOUSAND/ΜL (ref 0–0.61)
EOSINOPHIL NFR BLD AUTO: 1 % (ref 0–6)
ERYTHROCYTE [DISTWIDTH] IN BLOOD BY AUTOMATED COUNT: 12.9 % (ref 11.6–15.1)
EXT PREG TEST URINE: NEGATIVE
EXT. CONTROL ED NAV: NORMAL
GFR SERPL CREATININE-BSD FRML MDRD: 82 ML/MIN/1.73SQ M
GLUCOSE SERPL-MCNC: 81 MG/DL (ref 65–140)
HCT VFR BLD AUTO: 42.8 % (ref 34.8–46.1)
HGB BLD-MCNC: 13.8 G/DL (ref 11.5–15.4)
IMM GRANULOCYTES # BLD AUTO: 0.02 THOUSAND/UL (ref 0–0.2)
IMM GRANULOCYTES NFR BLD AUTO: 0 % (ref 0–2)
LYMPHOCYTES # BLD AUTO: 2.43 THOUSANDS/ΜL (ref 0.6–4.47)
LYMPHOCYTES NFR BLD AUTO: 22 % (ref 14–44)
MCH RBC QN AUTO: 27.5 PG (ref 26.8–34.3)
MCHC RBC AUTO-ENTMCNC: 32.2 G/DL (ref 31.4–37.4)
MCV RBC AUTO: 85 FL (ref 82–98)
MONOCYTES # BLD AUTO: 0.73 THOUSAND/ΜL (ref 0.17–1.22)
MONOCYTES NFR BLD AUTO: 7 % (ref 4–12)
NEUTROPHILS # BLD AUTO: 7.48 THOUSANDS/ΜL (ref 1.85–7.62)
NEUTS SEG NFR BLD AUTO: 69 % (ref 43–75)
NRBC BLD AUTO-RTO: 0 /100 WBCS
PLATELET # BLD AUTO: 468 THOUSANDS/UL (ref 149–390)
PMV BLD AUTO: 9.4 FL (ref 8.9–12.7)
POTASSIUM SERPL-SCNC: 4 MMOL/L (ref 3.5–5.3)
RBC # BLD AUTO: 5.01 MILLION/UL (ref 3.81–5.12)
SODIUM SERPL-SCNC: 137 MMOL/L (ref 135–147)
WBC # BLD AUTO: 10.84 THOUSAND/UL (ref 4.31–10.16)

## 2022-08-17 PROCEDURE — 85025 COMPLETE CBC W/AUTO DIFF WBC: CPT

## 2022-08-17 PROCEDURE — 80048 BASIC METABOLIC PNL TOTAL CA: CPT

## 2022-08-17 PROCEDURE — 99285 EMERGENCY DEPT VISIT HI MDM: CPT | Performed by: EMERGENCY MEDICINE

## 2022-08-17 PROCEDURE — 36415 COLL VENOUS BLD VENIPUNCTURE: CPT

## 2022-08-17 PROCEDURE — 99285 EMERGENCY DEPT VISIT HI MDM: CPT

## 2022-08-17 PROCEDURE — 71275 CT ANGIOGRAPHY CHEST: CPT

## 2022-08-17 PROCEDURE — 85379 FIBRIN DEGRADATION QUANT: CPT

## 2022-08-17 PROCEDURE — 84484 ASSAY OF TROPONIN QUANT: CPT

## 2022-08-17 PROCEDURE — 71045 X-RAY EXAM CHEST 1 VIEW: CPT

## 2022-08-17 PROCEDURE — 93005 ELECTROCARDIOGRAM TRACING: CPT

## 2022-08-17 PROCEDURE — G1004 CDSM NDSC: HCPCS

## 2022-08-17 PROCEDURE — 81025 URINE PREGNANCY TEST: CPT

## 2022-08-17 RX ADMIN — IOHEXOL 74 ML: 350 INJECTION, SOLUTION INTRAVENOUS at 22:22

## 2022-08-17 NOTE — ED ATTENDING ATTESTATION
8/17/2022  IDay MD, saw and evaluated the patient  I have discussed the patient with the resident/non-physician practitioner and agree with the resident's/non-physician practitioner's findings, Plan of Care, and MDM as documented in the resident's/non-physician practitioner's note, except where noted  All available labs and Radiology studies were reviewed  I was present for key portions of any procedure(s) performed by the resident/non-physician practitioner and I was immediately available to provide assistance  At this point I agree with the current assessment done in the Emergency Department  I have conducted an independent evaluation of this patient a history and physical is as follows:    ED Course     25-year-old female, history of rheumatoid arthritis and Crohn's disease, presenting to the emergency department for evaluation of multiple complaints  Patient states that over the past 3 days she has been experiencing intermittent headaches, gradual in onset, starts in the occiput and radiates over the top of her head, has been having elevated blood pressure, has been experiencing intermittent palpitations, and has had 1 5 days of gradual onset substernal chest pressure/pain which is slightly pleuritic  No new lower extremity pain or swelling  No diaphoresis  No nausea or vomiting  No difficulty with word-finding, speaking, weakness in the arms or legs  No change in vision  Ten systems reviewed and negative except as noted  The patient is resting comfortably on a stretcher in no acute respiratory distress  The patient appears nontoxic  HEENT reveals moist mucous membranes  Head is normocephalic and atraumatic  Conjunctiva and sclera are normal  Neck is nontender and supple with full range of motion to flexion, extension, lateral rotation  No meningismus appreciated  No masses are appreciated  Lungs are clear to auscultation bilaterally without any wheezes, rales or rhonchi  Heart is regular rate and rhythm without any murmurs, rubs or gallops  Abdomen is soft and nontender without any rebound or guarding  Extremities appear grossly normal without any significant arthropathy  Patient is awake, alert, and oriented x3  The patient has normal interaction  Cranial nerves 2-12 are intact  Motor is 5 out of 5 bilateral upper lower extremities  Normal gait  Labs Reviewed   CBC AND DIFFERENTIAL - Abnormal       Result Value Ref Range Status    WBC 10 84 (*) 4 31 - 10 16 Thousand/uL Final    RBC 5 01  3 81 - 5 12 Million/uL Final    Hemoglobin 13 8  11 5 - 15 4 g/dL Final    Hematocrit 42 8  34 8 - 46 1 % Final    MCV 85  82 - 98 fL Final    MCH 27 5  26 8 - 34 3 pg Final    MCHC 32 2  31 4 - 37 4 g/dL Final    RDW 12 9  11 6 - 15 1 % Final    MPV 9 4  8 9 - 12 7 fL Final    Platelets 579 (*) 575 - 390 Thousands/uL Final    nRBC 0  /100 WBCs Final    Neutrophils Relative 69  43 - 75 % Final    Immat GRANS % 0  0 - 2 % Final    Lymphocytes Relative 22  14 - 44 % Final    Monocytes Relative 7  4 - 12 % Final    Eosinophils Relative 1  0 - 6 % Final    Basophils Relative 1  0 - 1 % Final    Neutrophils Absolute 7 48  1 85 - 7 62 Thousands/µL Final    Immature Grans Absolute 0 02  0 00 - 0 20 Thousand/uL Final    Lymphocytes Absolute 2 43  0 60 - 4 47 Thousands/µL Final    Monocytes Absolute 0 73  0 17 - 1 22 Thousand/µL Final    Eosinophils Absolute 0 12  0 00 - 0 61 Thousand/µL Final    Basophils Absolute 0 06  0 00 - 0 10 Thousands/µL Final   D-DIMER, QUANTITATIVE - Abnormal    D-Dimer, Quant 1 19 (*) <0 50 ug/ml FEU Final    Comment: Reference and upper limits to exclude DVT and PE are the same  Do not use to exclude if clinical symptoms are present    Pregnant women:  1st trimester:  <0 22 - 1 06 ug/ml FEU  2nd trimester:  <0 22 - 1 88 ug/ml FEU  3rd trimester:   0 24 - 3 28 ug/ml FEU    Note: Normal ranges may not apply to patients who are transgender, non-binary, or whose legal sex, sex at birth, and gender identity differ  HS TROPONIN I 0HR - Normal    hs TnI 0hr <2  "Refer to ACS Flowchart"- see link ng/L Final    Comment:                                              Initial (time 0) result  If >=50 ng/L, Myocardial injury suggested ;  Type of myocardial injury and treatment strategy  to be determined  If 5-49 ng/L, a delta result at 2 hours and or 4 hours will be needed to further evaluate  If <4 ng/L, and chest pain has been >3 hours since onset, patient may qualify for discharge based on the HEART score in the ED  If <5 ng/L and <3hours since onset of chest pain, a delta result at 2 hours will be needed to further evaluate  HS Troponin 99th Percentile URL of a Health Population=12 ng/L with a 95% Confidence Interval of 8-18 ng/L  Second Troponin (time 2 hours)  If calculated delta >= 20 ng/L,  Myocardial injury suggested ; Type of myocardial injury and treatment strategy to be determined  If 5-49 ng/L and the calculated delta is 5-19 ng/L, consult medical service for evaluation  Continue evaluation for ischemia on ecg and other possible etiology and repeat hs troponin at 4 hours  If delta is <5 ng/L at 2 hours, consider discharge based on risk stratification via the HEART score (if in ED), or GAEL risk score in IP/Observation  HS Troponin 99th Percentile URL of a Health Population=12 ng/L with a 95% Confidence Interval of 8-18 ng/L     POCT PREGNANCY, URINE - Normal    EXT PREG TEST UR (Ref: Negative) Negative   Final    Control Valid   Final   BASIC METABOLIC PANEL    Sodium 961  135 - 147 mmol/L Final    Potassium 4 0  3 5 - 5 3 mmol/L Final    Chloride 105  96 - 108 mmol/L Final    CO2 25  21 - 32 mmol/L Final    ANION GAP 7  4 - 13 mmol/L Final    BUN 16  5 - 25 mg/dL Final    Creatinine 0 92  0 60 - 1 30 mg/dL Final    Comment: Standardized to IDMS reference method    Glucose 81  65 - 140 mg/dL Final    Comment: If the patient is fasting, the ADA then defines impaired fasting glucose as > 100 mg/dL and diabetes as > or equal to 123 mg/dL  Specimen collection should occur prior to Sulfasalazine administration due to the potential for falsely depressed results  Specimen collection should occur prior to Sulfapyridine administration due to the potential for falsely elevated results  Calcium 9 8  8 3 - 10 1 mg/dL Final    eGFR 82  ml/min/1 73sq m Final    Narrative:     [place] guidelines for Chronic Kidney Disease (CKD):     Stage 1 with normal or high GFR (GFR > 90 mL/min/1 73 square meters)    Stage 2 Mild CKD (GFR = 60-89 mL/min/1 73 square meters)    Stage 3A Moderate CKD (GFR = 45-59 mL/min/1 73 square meters)    Stage 3B Moderate CKD (GFR = 30-44 mL/min/1 73 square meters)    Stage 4 Severe CKD (GFR = 15-29 mL/min/1 73 square meters)    Stage 5 End Stage CKD (GFR <15 mL/min/1 73 square meters)  Note: GFR calculation is accurate only with a steady state creatinine       CTA ED chest PE study   Final Result      No evidence of pulmonary embolism  Workstation performed: [id number]         XR chest 1 view portable   ED Interpretation   No acute cardiopulmonary disease                       Critical Care Time  Procedures

## 2022-08-17 NOTE — Clinical Note
Claudia Mackay was seen and treated in our emergency department on 8/17/2022  Diagnosis:     Polina Lori    She may return on this date: 08/19/2022         If you have any questions or concerns, please don't hesitate to call        Frances Hill MD    ______________________________           _______________          _______________  Hospital Representative                              Date                                Time

## 2022-08-17 NOTE — ED PROVIDER NOTES
History  Chief Complaint   Patient presents with    Chest Pain     Pt reports having "higher than normal" BP since Friday, intermittent SOB, chest pain since yesterday and h/a since Saturday     Patient is a 28 y/o F with PMH IBD, orbital pseudotumor, RA presenting with concern of elevated blood pressure readings, chest pain, and headache  Patient states her PCP told her to monitor her blood pressure at home, which she started doing for the past few days  Since Friday she states her blood pressure readings started to elevated into 140s, 150s, and 160s  She is concerned at this sudden, unprovoked increase in her blood pressure  She also notes a mild 5/10 headache that she states "feels like a rubber band around my head" and starts from her neck  Gradual in onset, has tried tylenol with minimal relief  No new neurologic or visual complaints, no nausea or vomiting  Yesterday she states she had sudden onset substernal chest pressure that has been constant  Unknown inciting event, slightly worse with exertion  States deep breathing increases the pressure sensation but she does not feel short of breath with regular respirations  Pt reports OCP use, no prior DVT or PE, no recent travel or surgery  Prior to Admission Medications   Prescriptions Last Dose Informant Patient Reported? Taking?   levonorgestrel-ethinyl estradiol (Aviane) 0 1-20 MG-MCG per tablet   No No   Sig: Take 1 tablet by mouth daily   ustekinumab (Stelara) 90 mg/mL subcutaneous injection   No No   Sig: Inject (1) pre-filled syringe subcutaneously every 56 days      Facility-Administered Medications: None       Past Medical History:   Diagnosis Date    Chronic PID (chronic pelvic inflammatory disease)     [facility]     Obesity     Papanicolaou smear 2020    Pseudotumor     Bilat       RA (rheumatoid arthritis) (HCC)     [facility] arthritis ([facility]     Ulcerative colitis ([facility] )        Past Surgical History:   Procedure Laterality Date    APPENDECTOMY      2010    BOWEL RESECTION      2014    COLON SURGERY      COLONOSCOPY  2016    COLONOSCOPY      COLPOSCOPY  2019    [facility])  [facility]    HERNIA REPAIR      IA REPAIR INCISIONAL HERNIA,REDUCIBLE N/A 4/26/2016    Procedure: OPEN INCISIONAL HERNIA REPAIR X4 W/ UMBILICAL HERNIA REPAIR WITH MESH;  Surgeon: Violet Prasad MD;  Location: [facility];  Service: General    SALPINGECTOMY Right     2011       Family History   Problem Relation Age of Onset    Heart attack Maternal Grandfather     Heart attack Maternal Grandmother     Stroke Maternal Grandmother     Breast cancer Neg Hx     Uterine cancer Neg Hx     Ovarian cancer Neg Hx     Colon cancer Neg Hx      I have reviewed and agree with the history as documented  E-Cigarette/Vaping    E-Cigarette Use Never User      E-Cigarette/Vaping Substances    Nicotine No     THC Yes     CBD Yes     Flavoring No     Other No     Unknown No      Social History     Tobacco Use    Smoking status: Never Smoker    Smokeless tobacco: Never Used   Vaping Use    Vaping Use: Never used   Substance Use Topics    Alcohol use: Not Currently     Comment: rare    Drug use: Yes     Types: Marijuana     Comment: medical        Review of Systems   Constitutional: Negative for chills and fever  HENT: Negative for ear pain and sore throat  Eyes: Negative for pain and visual disturbance  Respiratory: Negative for cough and shortness of breath  Cardiovascular: Positive for chest pain  Negative for palpitations  Gastrointestinal: Negative for abdominal pain and vomiting  Genitourinary: Negative for dysuria and hematuria  Musculoskeletal: Negative for arthralgias and back pain  Skin: Negative for color change and rash  Neurological: Positive for headaches  Negative for seizures and syncope  All other systems reviewed and are negative        Physical Exam  ED Triage Vitals [08/17/22 1739]   Temperature Pulse Respirations Blood Pressure SpO2   98 4 °F (36 9 °C) 85 20 (!) 184/116 100 %      Temp Source Heart Rate Source Patient Position - Orthostatic VS BP Location FiO2 (%)   Oral Monitor Sitting Left arm --      Pain Score       6             Orthostatic Vital Signs  Vitals:    08/17/22 1739 08/17/22 1900   BP: (!) 184/116 146/87   Pulse: 85 80   Patient Position - Orthostatic VS: Sitting Lying       Physical Exam  Vitals and nursing note reviewed  Constitutional:       General: She is not in acute distress  HENT:      Head: Normocephalic and atraumatic  Right Ear: External ear normal       Left Ear: External ear normal       Nose: Nose normal       Mouth/Throat:      Pharynx: Oropharynx is clear  Eyes:      Extraocular Movements: Extraocular movements intact  Pupils: Pupils are equal, round, and reactive to light  Cardiovascular:      Rate and Rhythm: Normal rate and regular rhythm  Pulses: Normal pulses  Radial pulses are 2+ on the right side and 2+ on the left side  Posterior tibial pulses are 2+ on the right side and 2+ on the left side  Heart sounds: Normal heart sounds  No murmur heard  No systolic murmur is present  No friction rub  No gallop  Pulmonary:      Effort: Pulmonary effort is normal  No tachypnea or respiratory distress  Breath sounds: Normal breath sounds  No decreased breath sounds, wheezing, rhonchi or rales  Chest:      Chest wall: No tenderness  Abdominal:      General: Abdomen is flat  There is no distension  Palpations: Abdomen is soft  Tenderness: There is no abdominal tenderness  There is no guarding or rebound  Musculoskeletal:         General: No deformity  Normal range of motion  Cervical back: Normal range of motion  No rigidity or tenderness  Right lower leg: No tenderness  No edema  Left lower leg: No tenderness  No edema  Skin:     General: Skin is warm and dry        Capillary Refill: Capillary refill takes less than 2 seconds  Findings: No rash  Neurological:      General: No focal deficit present  Mental Status: She is alert and oriented to person, place, and time  Cranial Nerves: No cranial nerve deficit  Sensory: No sensory deficit  Motor: No weakness        Gait: Gait normal    Psychiatric:         Mood and Affect: Mood normal          ED Medications  Medications   iohexol (OMNIPAQUE) 350 MG/ML injection (SINGLE-DOSE) 74 mL (74 mL Intravenous Given 8/17/22 2222)       Diagnostic Studies  Results Reviewed     Procedure Component Value Units Date/Time    HS Troponin 0hr (reflex protocol) [[id number]]  (Normal) Collected: 08/17/22 1941    Lab Status: Final result Specimen: Blood from Arm, Right Updated: 08/17/22 2017     hs TnI 0hr <2 ng/L     D-Dimer [[id number]]  (Abnormal) Collected: 08/17/22 1941    Lab Status: Final result Specimen: Blood from Arm, Right Updated: 08/17/22 2006     D-Dimer, Quant 1 19 ug/ml FEU     Basic metabolic panel [[id number]] Collected: 08/17/22 1941    Lab Status: Final result Specimen: Blood from Arm, Right Updated: 08/17/22 2005     Sodium 137 mmol/L      Potassium 4 0 mmol/L      Chloride 105 mmol/L      CO2 25 mmol/L      ANION GAP 7 mmol/L      BUN 16 mg/dL      Creatinine 0 92 mg/dL      Glucose 81 mg/dL      Calcium 9 8 mg/dL      eGFR 82 ml/min/1 73sq m     Narrative:      [place] guidelines for Chronic Kidney Disease (CKD):     Stage 1 with normal or high GFR (GFR > 90 mL/min/1 73 square meters)    Stage 2 Mild CKD (GFR = 60-89 mL/min/1 73 square meters)    Stage 3A Moderate CKD (GFR = 45-59 mL/min/1 73 square meters)    Stage 3B Moderate CKD (GFR = 30-44 mL/min/1 73 square meters)    Stage 4 Severe CKD (GFR = 15-29 mL/min/1 73 square meters)    Stage 5 End Stage CKD (GFR <15 mL/min/1 73 square meters)  Note: GFR calculation is accurate only with a steady state creatinine    CBC and differential [[id number]] (Abnormal) Collected: 08/17/22 1941    Lab Status: Final result Specimen: Blood from Arm, Right Updated: 08/17/22 1953     WBC 10 84 Thousand/uL      RBC 5 01 Million/uL      Hemoglobin 13 8 g/dL      Hematocrit 42 8 %      MCV 85 fL      MCH 27 5 pg      MCHC 32 2 g/dL      RDW 12 9 %      MPV 9 4 fL      Platelets 297 Thousands/uL      nRBC 0 /100 WBCs      Neutrophils Relative 69 %      Immat GRANS % 0 %      Lymphocytes Relative 22 %      Monocytes Relative 7 %      Eosinophils Relative 1 %      Basophils Relative 1 %      Neutrophils Absolute 7 48 Thousands/µL      Immature Grans Absolute 0 02 Thousand/uL      Lymphocytes Absolute 2 43 Thousands/µL      Monocytes Absolute 0 73 Thousand/µL      Eosinophils Absolute 0 12 Thousand/µL      Basophils Absolute 0 06 Thousands/µL     POCT pregnancy, urine [[id number]]  (Normal) Resulted: 08/17/22 1951    Lab Status: Final result Updated: 08/17/22 1951     EXT PREG TEST UR (Ref: Negative) Negative     Control Valid                 CTA ED chest PE study   Final Result by Xiao Kennedy MD (08/17 2309)      No evidence of pulmonary embolism  Workstation performed: [id number]         XR chest 1 view portable   ED Interpretation by Dash Chambers MD (08/17 1959)   No acute cardiopulmonary disease  Final Result by Liz Lam MD (20/12 0771)      No acute cardiopulmonary disease  Workstation performed: [id number]               Procedures  Procedures      ED Course  ED Course as of 08/18/22 1852   Wed Aug 17, 2022   1838 ECG 12 lead  Procedure Note: EKG  Date/Time: 08/17/22 6:38 PM   Interpreted by: Ross Castleman, MD  Indications / Diagnosis: CP  ECG reviewed by me, the ED Provider: yes   The EKG demonstrates:  Rhythm: normal sinus  Intervals: normal intervals  Axis: normal axis  QRS/Blocks: normal QRS  ST Changes: No acute ST Changes, no STD/JAQUELIN                 HEART Risk Score    Flowsheet Row Most Recent Value   Heart Score Risk Calculator    History 0 Filed at: 08/18/2022 1851   ECG 0 Filed at: 08/18/2022 1851   Age 0 Filed at: 08/18/2022 1851   Risk Factors 0 Filed at: 08/18/2022 1851   Troponin 0 Filed at: 08/18/2022 1851   HEART Score 0 Filed at: 08/18/2022 1851                                MDM  Number of Diagnoses or Management Options  Chest pain, unspecified  Diagnosis management comments: 26 y/o F presenting with chest pain, likely tension-type headache, and concern over elevated blood pressure readings  Initial BP elevated to 517S systolic  Plan for cardiac w/u including dimer as pt cannot PERC out due to OCP use  Patient signed out prior to completion of work up  On review, patient did have elevated d dimer and CT PE study which was negative  HEART score of 0  BP improved prior to dc  Pt discharged with PCP f/u  Disposition  Final diagnoses:   Chest pain, unspecified     Time reflects when diagnosis was documented in both MDM as applicable and the Disposition within this note     Time User Action Codes Description Comment    8/17/2022 11:14 PM Ronal Fernandez Add [R07 9] Chest pain, unspecified       ED Disposition     ED Disposition   Discharge    Condition   Stable    Date/Time   Wed Aug 17, 2022 11:14 PM    Comment   [facility] discharge to home/self care                 Follow-up Information     Follow up With Specialties Details Why Contact Info Additional C/ Cañada Issa Gomez MD Internal Medicine   [place] [id number]  [address] [facility] [phone]       [facility] [address] Emergency Department Emergency Medicine   [address] [id number]  8 [facility] 64 [facility] Emergency Department, [address], [address]          Discharge Medication List as of 8/17/2022 11:15 PM      CONTINUE these medications which have NOT CHANGED    Details   levonorgestrel-ethinyl estradiol (Aviane) 0 1-20 MG-MCG per tablet Take 1 tablet by mouth daily, Starting Tue 8/2/2022, Until Wed 8/2/2023, Normal      ustekinumab (Stelara) 90 mg/mL subcutaneous injection Inject (1) pre-filled syringe subcutaneously every 56 days, Normal           No discharge procedures on file  PDMP Review     None           ED Provider  Attending physically available and evaluated Kushalyse Socorro Renee I managed the patient along with the ED Attending      Electronically Signed by         Lino Perez MD  08/18/22 0543

## 2022-08-18 LAB
ATRIAL RATE: 85 BPM
P AXIS: 42 DEGREES
PR INTERVAL: 128 MS
QRS AXIS: 52 DEGREES
QRSD INTERVAL: 72 MS
QT INTERVAL: 356 MS
QTC INTERVAL: 423 MS
T WAVE AXIS: 48 DEGREES
VENTRICULAR RATE: 85 BPM

## 2022-08-18 PROCEDURE — 93010 ELECTROCARDIOGRAM REPORT: CPT | Performed by: INTERNAL MEDICINE

## 2022-08-31 ENCOUNTER — OFFICE VISIT (OUTPATIENT)
Dept: GASTROENTEROLOGY | Facility: CLINIC | Age: 32
End: 2022-08-31
Payer: COMMERCIAL

## 2022-08-31 VITALS
BODY MASS INDEX: 42.56 KG/M2 | DIASTOLIC BLOOD PRESSURE: 86 MMHG | SYSTOLIC BLOOD PRESSURE: 128 MMHG | HEIGHT: 60 IN | WEIGHT: 216.8 LBS

## 2022-08-31 DIAGNOSIS — K50.813 CROHN'S DISEASE OF BOTH SMALL AND LARGE INTESTINE WITH FISTULA (HCC): Primary | ICD-10-CM

## 2022-08-31 LAB
ALBUMIN SERPL-MCNC: 4.4 G/DL (ref 3.8–4.8)
ALBUMIN/GLOB SERPL: 1.5 {RATIO} (ref 1.2–2.2)
ALP SERPL-CCNC: 48 IU/L (ref 44–121)
ALT SERPL-CCNC: 19 IU/L (ref 0–32)
AST SERPL-CCNC: 28 IU/L (ref 0–40)
BASOPHILS # BLD AUTO: 0 X10E3/UL (ref 0–0.2)
BASOPHILS NFR BLD AUTO: 0 %
BILIRUB SERPL-MCNC: <0.2 MG/DL (ref 0–1.2)
BUN SERPL-MCNC: 12 MG/DL (ref 6–20)
BUN/CREAT SERPL: 15 (ref 9–23)
CALCIUM SERPL-MCNC: 10.1 MG/DL (ref 8.7–10.2)
CHLORIDE SERPL-SCNC: 103 MMOL/L (ref 96–106)
CO2 SERPL-SCNC: 21 MMOL/L (ref 20–29)
CREAT SERPL-MCNC: 0.82 MG/DL (ref 0.57–1)
CRP SERPL-MCNC: 12 MG/L (ref 0–10)
EGFR: 97 ML/MIN/1.73
EOSINOPHIL # BLD AUTO: 0.1 X10E3/UL (ref 0–0.4)
EOSINOPHIL NFR BLD AUTO: 1 %
ERYTHROCYTE [DISTWIDTH] IN BLOOD BY AUTOMATED COUNT: 12.9 % (ref 11.7–15.4)
ERYTHROCYTE [SEDIMENTATION RATE] IN BLOOD BY WESTERGREN METHOD: 16 MM/HR (ref 0–32)
GAMMA INTERFERON BACKGROUND BLD IA-ACNC: 0.02 IU/ML
GLOBULIN SER-MCNC: 2.9 G/DL (ref 1.5–4.5)
GLUCOSE SERPL-MCNC: 78 MG/DL (ref 65–99)
HBV SURFACE AG SERPL QL IA: NEGATIVE
HCT VFR BLD AUTO: 38.4 % (ref 34–46.6)
HGB BLD-MCNC: 12.8 G/DL (ref 11.1–15.9)
IMM GRANULOCYTES # BLD: 0 X10E3/UL (ref 0–0.1)
IMM GRANULOCYTES NFR BLD: 0 %
LYMPHOCYTES # BLD AUTO: 1.8 X10E3/UL (ref 0.7–3.1)
LYMPHOCYTES NFR BLD AUTO: 18 %
M TB IFN-G CD4+ T-CELLS BLD-ACNC: 0.02 IU/ML
M TB IFN-G CD4+ T-CELLS BLD-ACNC: 0.03 IU/ML
MCH RBC QN AUTO: 27.9 PG (ref 26.6–33)
MCHC RBC AUTO-ENTMCNC: 33.3 G/DL (ref 31.5–35.7)
MCV RBC AUTO: 84 FL (ref 79–97)
MITOGEN IGNF BLD-ACNC: 9.84 IU/ML
MONOCYTES # BLD AUTO: 0.5 X10E3/UL (ref 0.1–0.9)
MONOCYTES NFR BLD AUTO: 5 %
NEUTROPHILS # BLD AUTO: 7.2 X10E3/UL (ref 1.4–7)
NEUTROPHILS NFR BLD AUTO: 76 %
PLATELET # BLD AUTO: 444 X10E3/UL (ref 150–450)
POTASSIUM SERPL-SCNC: 4.5 MMOL/L (ref 3.5–5.2)
PROT SERPL-MCNC: 7.3 G/DL (ref 6–8.5)
QUANTIFERON INCUBATION COMMENT: NORMAL
QUANTIFERON-TB GOLD PLUS: NEGATIVE
RBC # BLD AUTO: 4.58 X10E6/UL (ref 3.77–5.28)
SERVICE CMNT-IMP: NORMAL
SODIUM SERPL-SCNC: 139 MMOL/L (ref 134–144)
WBC # BLD AUTO: 9.6 X10E3/UL (ref 3.4–10.8)

## 2022-08-31 PROCEDURE — 99213 OFFICE O/P EST LOW 20 MIN: CPT | Performed by: INTERNAL MEDICINE

## 2022-08-31 RX ORDER — LORAZEPAM 0.5 MG/1
TABLET ORAL
COMMUNITY
Start: 2022-08-17

## 2022-08-31 RX ORDER — LISINOPRIL 10 MG/1
TABLET ORAL
COMMUNITY
Start: 2022-08-17

## 2022-08-31 RX ORDER — ATORVASTATIN CALCIUM 10 MG/1
TABLET, FILM COATED ORAL
COMMUNITY
Start: 2022-07-27

## 2022-08-31 NOTE — PROGRESS NOTES
[facility] - Outpatient Follow-up Note  Yesy Renee 27 y o  female MRN: [id number]  Encounter: [id number]    ASSESSMENT AND PLAN:      1  Crohn's disease of both small and large intestine with fistula ([facility] )  Clinically in remission on Stelara  I would continue this indefinitely  Colonoscopy was done this year should be repeated in 3 years  We discussed the association between arthritis and Crohn's  Followup Appointment:  1 year  ______________________________________________________________________    Chief Complaint   Patient presents with    Crohn's Disease     Follow up Stelara       HPI:  Patient is a very pleasant 74-year-old female with a long history of Crohn's disease doing well on Stelara  She has essentially no GI symptoms right now not having any diarrhea abdominal pain rectal bleeding or upper tract symptoms  She has been recently treated for high blood pressure  Historical Information   Past Medical History:   Diagnosis Date    Chronic PID (chronic pelvic inflammatory disease)     Crohn's disease ([facility] )     Obesity     Papanicolaou smear 2020    Pseudotumor     Bilat       RA (rheumatoid arthritis) ([facility] )     Rheumatoid arthritis ([facility] )     Ulcerative colitis ([facility] )      Past Surgical History:   Procedure Laterality Date    APPENDECTOMY      2010    BOWEL RESECTION      2014    COLON SURGERY      COLONOSCOPY  2016    COLONOSCOPY      COLPOSCOPY  2019    [facility]  [facility]    HERNIA REPAIR      MI REPAIR INCISIONAL HERNIA,REDUCIBLE N/A 4/26/2016    Procedure: OPEN INCISIONAL HERNIA REPAIR X4 W/ UMBILICAL HERNIA REPAIR WITH MESH;  Surgeon: Car Currie MD;  Location:  MAIN OR;  Service: General    SALPINGECTOMY Right     2011     Social History     Substance and Sexual Activity   Alcohol Use Not Currently    Comment: rare     Social History     Substance and Sexual Activity   Drug Use Yes    Types: Marijuana    Comment: medical     Social History     Tobacco Use   Smoking Status Never Smoker   Smokeless Tobacco Never Used     Family History   Problem Relation Age of Onset    Heart attack Maternal Grandfather     Heart attack Maternal Grandmother     Stroke Maternal Grandmother     Breast cancer Neg Hx     Uterine cancer Neg Hx     Ovarian cancer Neg Hx     Colon cancer Neg Hx          Current Outpatient Medications:     atorvastatin (LIPITOR) 10 mg tablet    levonorgestrel-ethinyl estradiol (Aviane) 0 1-20 MG-MCG per tablet    lisinopril (ZESTRIL) 10 mg tablet    LORazepam (ATIVAN) 0 5 mg tablet    ustekinumab (Stelara) 90 mg/mL subcutaneous injection  Allergies   Allergen Reactions    Humira [Adalimumab] Other (See Comments)     Made psoriasis worsen     Reviewed medications and allergies and updated as indicated    PHYSICAL EXAM:    Blood pressure 128/86, height 5' (1 524 m), weight 98 3 kg (216 lb 12 8 oz), last menstrual period 08/12/2022, not currently breastfeeding  Body mass index is 42 34 kg/m²  General Appearance: NAD, cooperative, alert  Eyes: Anicteric, PERRLA, EOMI  ENT:  Normocephalic, atraumatic, normal mucosa  Neck:  Supple, symmetrical, trachea midline  Resp:  Clear to auscultation bilaterally; no rales, rhonchi or wheezing; respirations unlabored   CV:  S1 S2, Regular rate and rhythm; no murmur, rub, or gallop  GI:  Soft, non-tender, non-distended; normal bowel sounds; no masses, no organomegaly   Rectal: Deferred  Musculoskeletal: No cyanosis, clubbing or edema  Normal ROM    Skin:  No jaundice, rashes, or lesions   Heme/Lymph: No palpable cervical lymphadenopathy  Psych: Normal affect, good eye contact  Neuro: No gross deficits, AAOx3    Lab Results:   Lab Results   Component Value Date    WBC 9 6 08/29/2022    HGB 12 8 08/29/2022    HCT 38 4 08/29/2022    MCV 84 08/29/2022     08/29/2022     Lab Results   Component Value Date    K 4 5 08/29/2022     08/29/2022 CO2 21 08/29/2022    BUN 12 08/29/2022    CREATININE 0 82 08/29/2022    CALCIUM 9 8 08/17/2022    AST 28 08/29/2022    ALT 19 08/29/2022    ALKPHOS 55 05/30/2018    EGFR 97 08/29/2022     No results found for: IRON, TIBC, FERRITIN  No results found for: LIPASE    Radiology Results:   XR chest 1 view portable    Result Date: 8/18/2022  Narrative: CHEST INDICATION:   chest pain  COMPARISON:  6/21/2022 EXAM PERFORMED/VIEWS:  XR CHEST PORTABLE FINDINGS: Cardiomediastinal silhouette appears unremarkable  The lungs are clear  No pneumothorax or pleural effusion  Osseous structures appear within normal limits for patient age  Impression: No acute cardiopulmonary disease  Workstation performed: [id number]     CTA ED chest PE study    Result Date: 8/17/2022  Narrative: CTA - CHEST WITH IV CONTRAST - PULMONARY ANGIOGRAM INDICATION:   Pulmonary embolism (PE) suspected, positive D-dimer Low pretest probability, positive D-dimer, pleuritic chest pressure  COMPARISON: None  TECHNIQUE: CTA examination of the chest was performed using angiographic technique according to a protocol specifically tailored to evaluate for pulmonary embolism  Axial, sagittal, and coronal 2D reformatted images were created from the source data and  submitted for interpretation  In addition, coronal 3D MIP postprocessing was performed on the acquisition scanner  Radiation dose length product (DLP) for this visit:  494 53 mGy-cm   This examination, like all CT scans performed in the [facility], was performed utilizing techniques to minimize radiation dose exposure, including the use of iterative  reconstruction and automated exposure control  IV Contrast:  74 mL of iohexol (OMNIPAQUE)  FINDINGS: PULMONARY ARTERIAL TREE:  No pulmonary embolus is seen  LUNGS:  No focal consolidation  There is no tracheal or endobronchial lesion  PLEURA:  Unremarkable  HEART/GREAT VESSELS:  Unremarkable for patient's age   No thoracic aortic aneurysm  MEDIASTINUM AND VANE:  Unremarkable  CHEST WALL AND LOWER NECK:   Unremarkable  VISUALIZED STRUCTURES IN THE UPPER ABDOMEN:  Small hiatal hernia  OSSEOUS STRUCTURES:  No acute fracture or destructive osseous lesion  Impression: No evidence of pulmonary embolism   Workstation performed: [id number]

## 2022-10-11 PROBLEM — Z01.419 ROUTINE GYNECOLOGICAL EXAMINATION: Status: RESOLVED | Noted: 2022-08-02 | Resolved: 2022-10-11

## 2023-03-24 ENCOUNTER — HOSPITAL ENCOUNTER (OUTPATIENT)
Dept: NON INVASIVE DIAGNOSTICS | Facility: HOSPITAL | Age: 33
Discharge: HOME | End: 2023-03-24

## 2023-03-24 DIAGNOSIS — I10 HTN (HYPERTENSION), BENIGN: ICD-10-CM

## 2023-03-24 DIAGNOSIS — R07.9 CHEST PAIN: ICD-10-CM

## 2023-03-24 DIAGNOSIS — R00.0 TACHYCARDIA, UNSPECIFIED: ICD-10-CM

## 2023-05-11 ENCOUNTER — TELEPHONE (OUTPATIENT)
Dept: OBGYN CLINIC | Facility: CLINIC | Age: 33
End: 2023-05-11

## 2023-05-11 NOTE — TELEPHONE ENCOUNTER
Pt called to request a Rx renewal for Aviane birth control ti be sent to [facility]  Rx written on 8/2/22 is still valid and patient should still have 1 refill remaining  Pt will call [facility] for refill, if refills still needed, pt will call back

## 2023-06-14 DIAGNOSIS — K50.813 CROHN'S DISEASE OF BOTH SMALL AND LARGE INTESTINE WITH FISTULA (HCC): ICD-10-CM

## 2023-06-14 RX ORDER — USTEKINUMAB 90 MG/ML
INJECTION, SOLUTION SUBCUTANEOUS
Qty: 1 ML | Refills: 6 | Status: SHIPPED | OUTPATIENT
Start: 2023-06-14

## 2023-07-06 ENCOUNTER — TELEPHONE (OUTPATIENT)
Dept: GASTROENTEROLOGY | Facility: CLINIC | Age: 33
End: 2023-07-06

## 2023-07-06 NOTE — TELEPHONE ENCOUNTER
Submitted request for new authorization for the patients Stelara 90 mg every 8 weeks on Navinet.     Contact plan to follow up on [facility] [place]

## 2023-07-26 ENCOUNTER — APPOINTMENT (OUTPATIENT)
Dept: RADIOLOGY | Facility: CLINIC | Age: 33
End: 2023-07-26
Payer: COMMERCIAL

## 2023-07-26 DIAGNOSIS — M07.60 ENTEROPATHIC ARTHROPATHIES, UNSPECIFIED SITE: ICD-10-CM

## 2023-07-26 PROCEDURE — 72202 X-RAY EXAM SI JOINTS 3/> VWS: CPT

## 2023-07-26 PROCEDURE — 72050 X-RAY EXAM NECK SPINE 4/5VWS: CPT

## 2023-07-26 PROCEDURE — 72110 X-RAY EXAM L-2 SPINE 4/>VWS: CPT

## 2023-07-26 PROCEDURE — 73630 X-RAY EXAM OF FOOT: CPT

## 2023-07-26 PROCEDURE — 73562 X-RAY EXAM OF KNEE 3: CPT

## 2023-07-30 DIAGNOSIS — Z30.41 SURVEILLANCE FOR BIRTH CONTROL, ORAL CONTRACEPTIVES: ICD-10-CM

## 2023-08-06 RX ORDER — LEVONORGESTREL AND ETHINYL ESTRADIOL 0.1-0.02MG
1 KIT ORAL DAILY
Qty: 84 TABLET | Refills: 0 | OUTPATIENT
Start: 2023-08-06

## 2023-08-08 ENCOUNTER — TELEPHONE (OUTPATIENT)
Dept: OBGYN CLINIC | Facility: CLINIC | Age: 33
End: 2023-08-08

## 2023-08-08 DIAGNOSIS — Z30.41 SURVEILLANCE FOR BIRTH CONTROL, ORAL CONTRACEPTIVES: ICD-10-CM

## 2023-08-08 RX ORDER — LEVONORGESTREL AND ETHINYL ESTRADIOL 0.1-0.02MG
1 KIT ORAL DAILY
Qty: 90 TABLET | Refills: 0 | Status: SHIPPED | OUTPATIENT
Start: 2023-08-08 | End: 2023-08-08 | Stop reason: SDUPTHER

## 2023-08-08 RX ORDER — LEVONORGESTREL AND ETHINYL ESTRADIOL 0.1-0.02MG
1 KIT ORAL DAILY
Qty: 90 TABLET | Refills: 0 | Status: SHIPPED | OUTPATIENT
Start: 2023-08-08 | End: 2024-08-07

## 2023-08-08 NOTE — TELEPHONE ENCOUNTER
Zoë fischer requesting refill of Avianne until well annual 11/30/2023. Prior well annual 8/2/2022.  Zulma, please advise

## 2023-11-01 DIAGNOSIS — Z30.41 SURVEILLANCE FOR BIRTH CONTROL, ORAL CONTRACEPTIVES: ICD-10-CM

## 2023-11-02 RX ORDER — LEVONORGESTREL AND ETHINYL ESTRADIOL 0.1-0.02MG
1 KIT ORAL DAILY
Qty: 84 TABLET | Refills: 0 | Status: SHIPPED | OUTPATIENT
Start: 2023-11-02

## 2023-11-30 ENCOUNTER — ANNUAL EXAM (OUTPATIENT)
Dept: OBGYN CLINIC | Facility: CLINIC | Age: 33
End: 2023-11-30
Payer: COMMERCIAL

## 2023-11-30 VITALS
SYSTOLIC BLOOD PRESSURE: 130 MMHG | HEIGHT: 60 IN | DIASTOLIC BLOOD PRESSURE: 78 MMHG | BODY MASS INDEX: 43.74 KG/M2 | WEIGHT: 222.8 LBS

## 2023-11-30 DIAGNOSIS — Z30.41 SURVEILLANCE FOR BIRTH CONTROL, ORAL CONTRACEPTIVES: ICD-10-CM

## 2023-11-30 DIAGNOSIS — Z12.4 CERVICAL CANCER SCREENING: ICD-10-CM

## 2023-11-30 DIAGNOSIS — N76.3 SUBACUTE VULVITIS: ICD-10-CM

## 2023-11-30 DIAGNOSIS — B97.7 HPV IN FEMALE: ICD-10-CM

## 2023-11-30 DIAGNOSIS — Z01.411 ENCOUNTER FOR GYNECOLOGICAL EXAMINATION WITH ABNORMAL FINDING: Primary | ICD-10-CM

## 2023-11-30 PROBLEM — Z01.419 ENCOUNTER FOR GYNECOLOGICAL EXAMINATION WITHOUT ABNORMAL FINDING: Status: ACTIVE | Noted: 2023-11-30

## 2023-11-30 PROCEDURE — 99395 PREV VISIT EST AGE 18-39: CPT | Performed by: OBSTETRICS & GYNECOLOGY

## 2023-11-30 RX ORDER — DULOXETIN HYDROCHLORIDE 60 MG/1
CAPSULE, DELAYED RELEASE ORAL
COMMUNITY
Start: 2023-05-01

## 2023-11-30 RX ORDER — LEVONORGESTREL AND ETHINYL ESTRADIOL 0.1-0.02MG
1 KIT ORAL DAILY
Qty: 84 TABLET | Refills: 4 | Status: SHIPPED | OUTPATIENT
Start: 2023-11-30

## 2023-11-30 RX ORDER — NYSTATIN AND TRIAMCINOLONE ACETONIDE 100000; 1 [USP'U]/G; MG/G
OINTMENT TOPICAL 2 TIMES DAILY
Qty: 30 G | Refills: 1 | Status: SHIPPED | OUTPATIENT
Start: 2023-11-30 | End: 2023-12-25

## 2023-11-30 RX ORDER — ACETAMINOPHEN 325 MG/1
TABLET ORAL
COMMUNITY

## 2023-11-30 NOTE — PROGRESS NOTES
[address]  [address] [facility], [address], [place], [address] [facility]    ASSESSMENT/PLAN: Isidoro West is a 28 y.o. Kirill Mcmanus who presents for annual gynecologic exam.    Encounter for routine gynecologic examination  - Routine well woman exam completed today. - HPV Vaccination status: Immunization series complete  - STI screening offered including HIV testing: Declined  - Contraceptive counseling discussed. Current contraception: condoms or combination OCPs:     Additional problems addressed during this visit:  1. Encounter for gynecological examination without abnormal finding    2. BMI 40.0-44.9, adult ([address])    3. HPV in female    4. Cervical cancer screening    5. Surveillance for birth control, oral contraceptives        CC:  Annual Gynecologic Examination    HPI: Isidoro West is a 28 y.o. Kirill Mcmanus who presents for annual gynecologic examination. 27 yo here for wellness exam . On ocp and   cycles monthly last one light. Denies ACHES and  BTB. The following portions of the patient's history were reviewed and updated as appropriate: She  has a past medical history of Chronic PID (chronic pelvic inflammatory disease), Crohn's disease ([address]), Depression (2019), Hypertension (8/17/22), Obesity, Papanicolaou smear (2020), Pseudotumor, RA (rheumatoid arthritis) ([address]), Rheumatoid arthritis ([address]), and Ulcerative colitis ([address]). She  has a past surgical history that includes Salpingectomy (Right); Appendectomy; Bowel resection; pr repair first abdominal wall hernia (N/A, 4/26/2016); Colonoscopy (2016); DXA procedure(historical) (2019); Colposcopy (2019); Colonoscopy; Colon surgery; and Hernia repair. Her family history includes Heart attack in her maternal grandfather and maternal grandmother; Stroke in her maternal grandmother. She  reports that she has never smoked. She has never used smokeless tobacco. She reports that she does not currently use alcohol.  She reports current drug use. Drug: Marijuana. Current Outpatient Medications   Medication Sig Dispense Refill   • acetaminophen (Tylenol) 325 mg tablet Every 6 hours     • atorvastatin (LIPITOR) 10 mg tablet      • Aviane 0.1-20 MG-MCG per tablet Take 1 tablet by mouth once daily 84 tablet 0   • DULoxetine (CYMBALTA) 60 mg delayed release capsule      • lisinopril (ZESTRIL) 10 mg tablet      • LORazepam (ATIVAN) 0.5 mg tablet if needed     • ustekinumab (Stelara) 90 mg/mL subcutaneous injection INJECT 1 SYRINGE SUBCUTANEOUSLY EVERY 56 DAYS. REFRIGERATE. DO NOT FREEZE. 1 mL 6     No current facility-administered medications for this visit. She is allergic to humira [adalimumab]. .    Review of Systems   Constitutional:  Negative for chills and fever. HENT:  Negative for ear pain and sore throat. Eyes:  Negative for pain and visual disturbance. Respiratory:  Negative for cough and shortness of breath. Cardiovascular:  Negative for chest pain and palpitations. Gastrointestinal:  Negative for abdominal pain and vomiting. Genitourinary: Negative. Negative for dysuria and hematuria. Musculoskeletal:  Negative for arthralgias and back pain. Skin:  Negative for color change and rash. Neurological:  Negative for seizures and syncope. Hematological: Negative. Psychiatric/Behavioral: Negative. All other systems reviewed and are negative. Objective:  /78   Ht 5' (1.524 m)   Wt 101 kg (222 lb 12.8 oz)   LMP 10/29/2023 (Approximate)   Breastfeeding No   BMI 43.51 kg/m²    Physical Exam  Vitals and nursing note reviewed. Constitutional:       Appearance: Normal appearance. She is normal weight. HENT:      Head: Normocephalic. Cardiovascular:      Rate and Rhythm: Normal rate and regular rhythm. Pulses: Normal pulses. Heart sounds: Normal heart sounds. Pulmonary:      Effort: Pulmonary effort is normal.      Breath sounds: Normal breath sounds.    Chest:      Chest wall: No mass, lacerations, swelling, tenderness or edema. Breasts: Pj Score is 4. Breasts are symmetrical.      Right: Normal. No swelling, bleeding, inverted nipple, mass, nipple discharge, skin change or tenderness. Left: No swelling, bleeding, inverted nipple, mass, nipple discharge, skin change or tenderness. Abdominal:      General: Abdomen is flat. Bowel sounds are normal.      Palpations: Abdomen is soft. Genitourinary:     General: Normal vulva. Exam position: Lithotomy position. Pubic Area: No rash. Pj stage (genital): 4.      Labia:         Right: No rash, tenderness or lesion. Left: No rash, tenderness or lesion. Urethra: No urethral pain, urethral swelling or urethral lesion. Vagina: Normal.      Cervix: No cervical motion tenderness or discharge. Uterus: Normal.       Adnexa: Right adnexa normal and left adnexa normal.      Rectum: Normal.      Comments: Bilateral inguinal folds  excoriation and irritation    Musculoskeletal:         General: Normal range of motion. Cervical back: Neck supple. Lymphadenopathy:      Upper Body:      Right upper body: No supraclavicular, axillary or pectoral adenopathy. Left upper body: No supraclavicular, axillary or pectoral adenopathy. Lower Body: No right inguinal adenopathy. No left inguinal adenopathy. Skin:     General: Skin is warm and dry. Neurological:      General: No focal deficit present. Mental Status: She is alert and oriented to person, place, and time. Psychiatric:         Mood and Affect: Mood normal.         Behavior: Behavior normal.         Thought Content:  Thought content normal.         Judgment: Judgment normal.

## 2023-12-07 LAB
CYTOLOGIST CVX/VAG CYTO: ABNORMAL
DX ICD CODE: ABNORMAL
HPV GENOTYPE REFLEX: ABNORMAL
HPV I/H RISK 4 DNA CVX QL PROBE+SIG AMP: POSITIVE
HPV16 DNA CVX QL PROBE+SIG AMP: NEGATIVE
HPV18+45 E6+E7 MRNA CVX QL NAA+PROBE: NEGATIVE
Lab: ABNORMAL
OTHER STN SPEC: ABNORMAL
PATH REPORT.FINAL DX SPEC: ABNORMAL
SL AMB NOTE:: ABNORMAL
SL AMB SPECIMEN ADEQUACY: ABNORMAL
SL AMB TEST METHODOLOGY: ABNORMAL

## 2023-12-26 ENCOUNTER — TELEPHONE (OUTPATIENT)
Dept: OBGYN CLINIC | Facility: CLINIC | Age: 33
End: 2023-12-26

## 2023-12-26 DIAGNOSIS — Z30.41 SURVEILLANCE FOR BIRTH CONTROL, ORAL CONTRACEPTIVES: ICD-10-CM

## 2023-12-26 RX ORDER — LEVONORGESTREL AND ETHINYL ESTRADIOL 0.1-0.02MG
KIT ORAL DAILY
Qty: 84 TABLET | Refills: 0 | Status: SHIPPED | OUTPATIENT
Start: 2023-12-26 | End: 2024-03-19

## 2023-12-26 NOTE — TELEPHONE ENCOUNTER
Please have patient take home pregnancy test today. If negative,  new pack and take three (3) tablets once daily x 7 days, then start new pack (active pills) in traditional fashion. Rx sent. This should temporize her bleeding over the coming days. Please review with her that she may experience nausea and/or bloating while taking three tabs per day. She should also expect some breakthrough bleeding in the week after transitioning from three tabs to one tab daily. Please have keep menstrual diary and follow up in office in 3 months to discuss ongoing contraception/menstrual management. I do recommend that she consider IUD or implant since she is now on methotrexate.     Pb Alaniz MD  12/26/2023 9:26 AM

## 2023-12-26 NOTE — TELEPHONE ENCOUNTER
Zoë l/m she has been having irregular bleeding since her well annual on 11/30.  Return call to Zoë who reports she had missed 2 OCP doses in November and doubled up to catch up.  She finished November pill pack and had regular light menses 12/1/2023. Started with bloody type discharge 2 weeks into new pack.  Has not missed any doses this month.  Bleeding increased to menstrual type bleeding one week ago. Reports changing overnite pad every 3 hours consistently over the past week. Last checked HPT x 2 very end of October after having an unusually light menses. Both were negative. Has not checked HPT since. Of note, started methotrexate  by Rheumatologist mid December around the same time onset of bloody discharge  Denies vaginal odor/pain. She is to avoid ibuprofen due to crohns.  Currently hs 2 pills left in current pill pack. Dr. Alaniz , please review and advise in Zulma's absence.

## 2023-12-26 NOTE — TELEPHONE ENCOUNTER
Return call to Zoë, reviewed Dr Alaniz's recommendations. Patient verbalized understanding and agreement to plan. Encouraged c/b with any questions/concerns or persistant bleeding.

## 2023-12-27 ENCOUNTER — TELEPHONE (OUTPATIENT)
Dept: OBGYN CLINIC | Facility: CLINIC | Age: 33
End: 2023-12-27

## 2023-12-27 NOTE — TELEPHONE ENCOUNTER
[facility] started prior authorization process for Aviane via [email or web address]. This process was done due to over ride quantity of pills. This nurse completed prior authorization on [email or web address]. Awaiting insurance response.

## 2024-01-17 ENCOUNTER — TELEPHONE (OUTPATIENT)
Dept: OBGYN CLINIC | Facility: CLINIC | Age: 34
End: 2024-01-17

## 2024-01-17 NOTE — TELEPHONE ENCOUNTER
Zoë left a message is still having irregular menses since 11/30/23. She said did take OCPs as directed by Dr. Alaniz. Left message on Zoë's voice mail to schedule an appt to discuss OCPs & bleeding. Zoë did call back & spoke with  to schedule appt..

## 2024-01-23 ENCOUNTER — OFFICE VISIT (OUTPATIENT)
Dept: OBGYN CLINIC | Facility: CLINIC | Age: 34
End: 2024-01-23
Payer: COMMERCIAL

## 2024-01-23 VITALS
WEIGHT: 222 LBS | SYSTOLIC BLOOD PRESSURE: 124 MMHG | HEIGHT: 60 IN | DIASTOLIC BLOOD PRESSURE: 80 MMHG | BODY MASS INDEX: 43.59 KG/M2

## 2024-01-23 DIAGNOSIS — N92.1 MENORRHAGIA WITH IRREGULAR CYCLE: Primary | ICD-10-CM

## 2024-01-23 PROCEDURE — 99213 OFFICE O/P EST LOW 20 MIN: CPT | Performed by: NURSE PRACTITIONER

## 2024-01-23 RX ORDER — NORGESTREL AND ETHINYL ESTRADIOL 0.3-0.03MG
1 KIT ORAL DAILY
Qty: 90 TABLET | Refills: 1 | Status: SHIPPED | OUTPATIENT
Start: 2024-01-23 | End: 2024-07-21

## 2024-01-23 RX ORDER — FOLIC ACID 1 MG/1
1000 TABLET ORAL DAILY
COMMUNITY
Start: 2024-01-06

## 2024-01-23 RX ORDER — METHOTREXATE 2.5 MG/1
15 TABLET ORAL WEEKLY
COMMUNITY
Start: 2024-01-06

## 2024-01-23 NOTE — PROGRESS NOTES
[facility] - [address], [address], [place] [address]    Assessment/Plan:  1. Amenorrhea    2. Menorrhagia with irregular cycle  -     CBC and Platelet; Future  -     TSH + Free T4; Future  -     US pelvis complete non OB; Future; Expected date: 2024  -     CBC and Platelet  -     TSH + Free T4    Will change OCP, pt currently in placebo week, will start as soon as picks up new pack.   Pt to go to ER for worsening or severe pain, if soaking pad in less than one hour, or if feeling unwell (lightheaded, weak, etc.).   Discussed possible treatment with Mirena, will discuss further after imaging and labs are complete.     HPI:  Zoë Renee is a 33 y.o.  female presenting for office visit due to bleeding continuously since early December.  States bleeding is moderate to heavy, may have stopped at one point for a day or two, then continued.  She otherwise feels well and denies associated symptoms.  She denies lightheadedness or weakness, normal appetite, no N/V.  Recently noted left sided flank pain that she reports as mild and intermittent, possibly muscle strain. Currently on OCPs, takes every day at the same time.  Recently started Methotrexate for her RA and Cymbalta within the last 6 months.          Gyn History  Patient's last menstrual period was 2023.       Last pap smear: 2023, NIL, HPV positive  Monogamous relationship, longterm, denies potential risk of STI exposure.  Not sexually active in recent months.         OB History      Past Medical History:  No date: Chronic PID (chronic pelvic inflammatory disease)  No date: Crohn's disease (HCC)  2019: Depression  22: Hypertension  No date: Obesity  2020: Papanicolaou smear  No date: Pseudotumor      Comment:  Bilat.   No date: RA (rheumatoid arthritis) (HCC)  No date: Rheumatoid arthritis (HCC)  No date: Ulcerative colitis (HCC)     Past Surgical History:  No date: APPENDECTOMY      Comment:   2010  No date: BOWEL RESECTION      Comment:  2014  No date: COLON SURGERY  2016: COLONOSCOPY  No date: COLONOSCOPY  2019: COLPOSCOPY  2019: DXA PROCEDURE (HISTORICAL)      Comment:  GV  No date: HERNIA REPAIR  4/26/2016: AL REPAIR FIRST ABDOMINAL WALL HERNIA; N/A      Comment:  Procedure: OPEN INCISIONAL HERNIA REPAIR X4 W/ UMBILICAL               HERNIA REPAIR WITH MESH;  Surgeon: Neo Snyder MD;                Location: [facility];  Service: General  No date: SALPINGECTOMY; Right      Comment:  2011     Social History     Tobacco Use    Smoking status: Never     Passive exposure: Never    Smokeless tobacco: Never   Vaping Use    Vaping status: Never Used   Substance Use Topics    Alcohol use: Not Currently     Comment: rare    Drug use: Yes     Types: Marijuana          Current Outpatient Medications:     acetaminophen (Tylenol) 325 mg tablet, Every 6 hours, Disp: , Rfl:     atorvastatin (LIPITOR) 10 mg tablet, , Disp: , Rfl:     DULoxetine (CYMBALTA) 60 mg delayed release capsule, , Disp: , Rfl:     folic acid (FOLVITE) 1 mg tablet, Take 1,000 mcg by mouth daily, Disp: , Rfl:     levonorgestrel-ethinyl estradiol (Aviane) 0.1-20 MG-MCG per tablet, Take 3 tablets by mouth daily for 7 days, THEN 1 tablet daily., Disp: 84 tablet, Rfl: 0    lisinopril (ZESTRIL) 10 mg tablet, , Disp: , Rfl:     LORazepam (ATIVAN) 0.5 mg tablet, if needed, Disp: , Rfl:     methotrexate 2.5 MG tablet, Take 15 mg by mouth once a week, Disp: , Rfl:     Multiple Vitamins-Minerals (MULTIVITAMIN ADULT, MINERALS, PO), every 24 hours, Disp: , Rfl:     nystatin-triamcinolone (MYCOLOG-II) ointment, Apply topically 2 (two) times a day for 25 days, Disp: 30 g, Rfl: 1    ustekinumab (Stelara) 90 mg/mL subcutaneous injection, INJECT 1 SYRINGE SUBCUTANEOUSLY EVERY 56 DAYS. REFRIGERATE. DO NOT FREEZE., Disp: 1 mL, Rfl: 6    She is allergic to humira [adalimumab]..    ROS:  Negative except as noted in HPI    Objective:  /80 (BP Location:  Left arm, Patient Position: Sitting, Cuff Size: Standard)   Ht 5' (1.524 m)   Wt 101 kg (222 lb)   LMP 12/05/2023   BMI 43.36 kg/m²      Physical Exam  Constitutional:       General: She is not in acute distress.     Appearance: Normal appearance. She is obese.   Abdominal:      General: Abdomen is flat. There is no distension.      Palpations: Abdomen is soft.      Tenderness: There is no abdominal tenderness. There is no guarding.   Genitourinary:     General: Normal vulva.      Vagina: Bleeding (small amount dark red blood in vault) present.      Cervix: Normal. No cervical motion tenderness, discharge or lesion.      Uterus: Normal. Not tender.       Adnexa: Right adnexa normal and left adnexa normal.        Right: No mass or tenderness.          Left: No mass or tenderness.     Skin:     General: Skin is warm and dry.   Neurological:      Mental Status: She is alert.

## 2024-01-29 PROBLEM — Z01.419 ENCOUNTER FOR GYNECOLOGICAL EXAMINATION WITHOUT ABNORMAL FINDING: Status: RESOLVED | Noted: 2023-11-30 | Resolved: 2024-01-29

## 2024-01-29 PROBLEM — Z12.4 CERVICAL CANCER SCREENING: Status: RESOLVED | Noted: 2023-11-30 | Resolved: 2024-01-29

## 2024-04-11 ENCOUNTER — TELEPHONE (OUTPATIENT)
Age: 34
End: 2024-04-11

## 2024-04-11 LAB
ERYTHROCYTE [DISTWIDTH] IN BLOOD BY AUTOMATED COUNT: 12.8 % (ref 11.7–15.4)
HCT VFR BLD AUTO: 38.4 % (ref 34–46.6)
HGB BLD-MCNC: 12.4 G/DL (ref 11.1–15.9)
MCH RBC QN AUTO: 28 PG (ref 26.6–33)
MCHC RBC AUTO-ENTMCNC: 32.3 G/DL (ref 31.5–35.7)
MCV RBC AUTO: 87 FL (ref 79–97)
PLATELET # BLD AUTO: 478 X10E3/UL (ref 150–450)
RBC # BLD AUTO: 4.43 X10E6/UL (ref 3.77–5.28)
T4 FREE SERPL DIALY-MCNC: 1.1 NG/DL
TSH SERPL-ACNC: 2.1 UU/ML
WBC # BLD AUTO: 10.2 X10E3/UL (ref 3.4–10.8)

## 2024-04-11 NOTE — TELEPHONE ENCOUNTER
Abeba called to confirm Zoë's current OCP. Zoë was in for a visit and advised PCP she had changed her birth control pill. Reviewed OCP change to Lo ogestrel 0.3/30 due to patient reporting heavy bleeding during menses   spouse

## 2024-04-26 ENCOUNTER — TELEPHONE (OUTPATIENT)
Age: 34
End: 2024-04-26

## 2024-04-26 NOTE — TELEPHONE ENCOUNTER
Patient states she received a call from central scheduling to schedule a pelvic US. Patient is unsure the reasoning they are calling to schedule. Upon review of notes from 1/23/24, patient was ordered US due to irregular cycle. Patient states since OCP was changed her cycle has become regular. Patient states she does not feel like she needs pelvic US at this time.

## 2024-06-04 DIAGNOSIS — K50.813 CROHN'S DISEASE OF BOTH SMALL AND LARGE INTESTINE WITH FISTULA (HCC): ICD-10-CM

## 2024-06-04 RX ORDER — USTEKINUMAB 90 MG/ML
INJECTION, SOLUTION SUBCUTANEOUS
Qty: 1 ML | Refills: 1 | Status: SHIPPED | OUTPATIENT
Start: 2024-06-04

## 2024-06-11 ENCOUNTER — TELEPHONE (OUTPATIENT)
Dept: GASTROENTEROLOGY | Facility: CLINIC | Age: 34
End: 2024-06-11

## 2024-06-11 NOTE — TELEPHONE ENCOUNTER
Current authorization for Stelara expires next month. Scheduled for follow up 6/19. Will wait for that appt to submit new auth

## 2024-06-14 RX ORDER — PANTOPRAZOLE SODIUM 20 MG/1
20 TABLET, DELAYED RELEASE ORAL DAILY
COMMUNITY
Start: 2024-05-13

## 2024-06-14 RX ORDER — AMMONIUM LACTATE 12 G/100G
CREAM TOPICAL
COMMUNITY
Start: 2024-04-11

## 2024-06-14 RX ORDER — ATORVASTATIN CALCIUM 20 MG/1
20 TABLET, FILM COATED ORAL DAILY
COMMUNITY
Start: 2024-04-11 | End: 2024-06-14

## 2024-06-14 RX ORDER — ATORVASTATIN CALCIUM 20 MG/1
20 TABLET, FILM COATED ORAL DAILY
COMMUNITY
Start: 2024-05-15

## 2024-06-14 RX ORDER — AMMONIUM LACTATE 12 G/100G
CREAM TOPICAL
COMMUNITY
Start: 2024-05-15

## 2024-06-14 RX ORDER — CIPROFLOXACIN 500 MG/1
TABLET, FILM COATED ORAL
COMMUNITY
Start: 2024-04-11

## 2024-06-19 ENCOUNTER — OFFICE VISIT (OUTPATIENT)
Dept: GASTROENTEROLOGY | Facility: CLINIC | Age: 34
End: 2024-06-19
Payer: COMMERCIAL

## 2024-06-19 VITALS
DIASTOLIC BLOOD PRESSURE: 70 MMHG | SYSTOLIC BLOOD PRESSURE: 121 MMHG | BODY MASS INDEX: 44.37 KG/M2 | WEIGHT: 226 LBS | TEMPERATURE: 98 F | HEIGHT: 60 IN

## 2024-06-19 DIAGNOSIS — M05.20 RHEUMATOID ARTERITIS (HCC): ICD-10-CM

## 2024-06-19 DIAGNOSIS — K50.813 CROHN'S DISEASE OF BOTH SMALL AND LARGE INTESTINE WITH FISTULA (HCC): Primary | ICD-10-CM

## 2024-06-19 DIAGNOSIS — K21.9 GASTROESOPHAGEAL REFLUX DISEASE WITHOUT ESOPHAGITIS: ICD-10-CM

## 2024-06-19 PROCEDURE — 99214 OFFICE O/P EST MOD 30 MIN: CPT | Performed by: INTERNAL MEDICINE

## 2024-06-19 RX ORDER — POLYETHYLENE GLYCOL 3350 17 G/17G
238 POWDER, FOR SOLUTION ORAL ONCE
Qty: 238 G | Refills: 0 | Status: SHIPPED | OUTPATIENT
Start: 2024-06-19 | End: 2024-06-19

## 2024-06-19 RX ORDER — BISACODYL 5 MG/1
10 TABLET, DELAYED RELEASE ORAL ONCE
Qty: 2 TABLET | Refills: 0 | Status: SHIPPED | OUTPATIENT
Start: 2024-06-19 | End: 2024-06-19

## 2024-06-19 NOTE — PATIENT INSTRUCTIONS
Scheduled date of EGD/colonoscopy (as of today):08.12.24  Physician performing EGD/colonoscopy:DR GRIFFIN  Location of EGD/colonoscopy:[facility]  Desired bowel prep reviewed with patient:NEENA/ANUPAMA  Instructions reviewed with patient by:ABRAHAM  Clearances:  N/A  Pt will schedule MRI

## 2024-06-19 NOTE — LETTER
June 19, 2024     GASTRO IBD    Patient: Zoë Renee   YOB: 1990   Date of Visit: 6/19/2024       Dear  Ibd:    Thank you for referring Zoë Renee to me for evaluation. Below are my notes for this consultation.    If you have questions, please do not hesitate to call me. I look forward to following your patient along with you.         Sincerely,        David Weeks MD        CC: No Recipients    David Weeks MD  6/19/2024 11:19 AM  Incomplete  [facility] - Outpatient Follow-up Note  Zoë Renee 33 y.o. 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 She continues on Stelara 90 mg [id number] and is overall doing well, but having worsening GERD symptoms.     Crohn's disease  - continue stelara  - repeat biologic labs  - plan for repeat colonoscopy   - sees optometrist, previously ophthalmologist, sees gyn for annual pap smears  - no recent derm visit   - discussed procedure. Consent signed. Miralax/Dulcolax prep ordered  - refer to ophthalmology and dermatology   - check CBC, CMP, CRP, hepatitis serologies, Quantiferon Gold, Vit D and B12  - check MR enterography     2. GERD  - continue pantoprazole 40 mg po daily  - GERD lifestyle changes discussed, including avoidance of trigger foods (potential foods include coffee, caffeine, chocolate, mint, tomato-based products, spicy foods, fatty foods), avoid tight fitting clothing, elevate head of bed 30 degrees, avoid eating 2-3 hours prior to bedtime, weight loss, avoid alcohol, avoid tobacco use.   - plan for EGD     ______________________________________________________________________    SUBJECTIVE:  32 yo F with history of class III obesity, HTN, PUD  and Crohn's disease involving small and large bowel s/p ileocecectomy with side-to-end ileal to transverse colonic anastomosis  and surgery for vipul-anal abscess with fistula on Stelara who presents for GI follow up. She continues on Stelara with prior auth expiring next month. She is on 90 mg [id number]. Previous on Lialda. Previously on Humira, methotrexate (still on po for RA). Diagnosed over 10 yrs ago. Hx of ileocecectomy with ileosigmoid repair (2014). She reports over the past couple months she has belching and has significant pressure in her upper abd. She will eat cereal and will start to feel nauseous. She takes pantoprazole 40 mg po daily. EGD in 2008 showing 1 cm pre-pyloric clean based ulcer with gastric bx negative for H pylori. She says peanut butter gives her heartburn. Doing well from Crohn's standpoint. Has small flares every few months, but these are mild and do not require steroids or hospitalization. Last was a few months ago. She gets nausea, fatigue, 5-6 loose stools a day. Denies fevers, chills, weight loss, vomiting, abd pain, diarrhea, constipation. No nsaid use. Rare alcohol use, no tobacco use. No FH of IBD.     Colonoscopy from 11/15/21 showing hemorrhoids, end-to-end ileocolonic anastomosis with mild erythema with plan for repeat in 3 yrs. Path showed mild acute inflammation with mildly active chronic colitis     Answers submitted by the patient for this visit:  Inflammatory Bowel Disease (Submitted on 6/19/2024)  When you are not experiencing symptoms of your inflammatory bowel disease, how many bowel movements do you typically have each day?: 3  What is the average (typical) number of bowel movements that you had in a single day during the last week?: 4  Over the last 3 days, have you had any bowel movements where you passed blood without stool?: No  Since your last visit, have you received any vaccinations?: No  Since the last visit, have you had an infection?: No  Is there any  possibility that you may be pregnant?: No  In the past three months, have you used tobacco in any form?: No  During the last year, how many days have you missed work or school because of your inflammatory bowel disease?: 5  During the last year, how many days have you been hospitalized because of your inflammatory bowel disease?: 0  During the last year, how many days have you visited a hospital emergency department because of your inflammatory bowel disease?: 0  During the last month, have you taken narcotic pain medications (such as Percocet, oxycodone, Oxycontin, morphine, Vicodin, Dilaudid, MS Contin) for your inflammatory bowel disease?: No  Have you awoken at night because you needed to move your bowels during the last month? : Yes  Have you had leakage of stool while sleeping during the last month?: Yes  Have you had leakage of stool while you were awake during the last month?: Yes  In the last 6 months, have you unintentionally lost weight?: No  Fever: No  Eye irritation: No  Mouth sores: Yes  Sore throat: No  Chest pain: No  Shortness of breath: Yes  Numbness or tingling in your hands or feet: Yes  Skin rash: No  Pain or swelling in your joints: Yes  Bruising or bleeding: No  Felt depressed or blue: Yes      REVIEW OF SYSTEMS IS OTHERWISE NEGATIVE.      Historical Information  Past Medical History:   Diagnosis Date    Chronic PID (chronic pelvic inflammatory disease)     Crohn's disease (HCC)     Depression 2019    Hypertension 8/17/22    Obesity     Papanicolaou smear 2020    Pseudotumor     Bilat.     RA (rheumatoid arthritis) (HCC)     Rheumatoid arthritis (HCC)     Ulcerative colitis (HCC)      Past Surgical History:   Procedure Laterality Date    APPENDECTOMY      2010    BOWEL RESECTION      2014    COLON SURGERY      COLONOSCOPY  2016    COLONOSCOPY      COLPOSCOPY  2019    DXA PROCEDURE (HISTORICAL)  2019    [facility]    HERNIA REPAIR      [place] REPAIR FIRST ABDOMINAL WALL HERNIA N/A 4/26/2016    Procedure:  OPEN INCISIONAL HERNIA REPAIR X4 W/ UMBILICAL HERNIA REPAIR WITH MESH;  Surgeon: Neo Snyder MD;  Location: [facility];  Service: General    SALPINGECTOMY Right     2011     Social History  Social History     Substance and Sexual Activity   Alcohol Use Not Currently    Comment: rare     Social History     Substance and Sexual Activity   Drug Use Yes    Types: Marijuana     Social History     Tobacco Use   Smoking Status Never    Passive exposure: Never   Smokeless Tobacco Never     Family History   Problem Relation Age of Onset    Heart attack Maternal Grandfather     Heart attack Maternal Grandmother     Stroke Maternal Grandmother     Breast cancer Neg Hx     Uterine cancer Neg Hx     Ovarian cancer Neg Hx     Colon cancer Neg Hx        Meds/Allergies      Current Outpatient Medications:     acetaminophen (Tylenol) 325 mg tablet    ammonium lactate (LAC-HYDRIN) 12 % cream    ammonium lactate (LAC-HYDRIN) 12 % cream    atorvastatin (LIPITOR) 20 mg tablet    bisacodyl (DULCOLAX) 5 mg EC tablet    ciprofloxacin (CIPRO) 500 mg tablet    DULoxetine (CYMBALTA) 60 mg delayed release capsule    folic acid (FOLVITE) 1 mg tablet    lisinopril (ZESTRIL) 10 mg tablet    LORazepam (ATIVAN) 0.5 mg tablet    methotrexate 2.5 MG tablet    Multiple Vitamins-Minerals (MULTIVITAMIN ADULT, MINERALS, PO)    norgestrel-ethinyl estradiol (Low-Ogestrel) 0.3 mg-30 mcg per tablet    pantoprazole (PROTONIX) 20 mg tablet    polyethylene glycol (MiraLax) 17 GM/SCOOP powder    ustekinumab (Stelara) 90 mg/mL subcutaneous injection    nystatin-triamcinolone (MYCOLOG-II) ointment    Allergies   Allergen Reactions    Humira [Adalimumab] Other (See Comments)     Made psoriasis worsen           Objective    Blood pressure 121/70, temperature 98 °F (36.7 °C), temperature source Tympanic, height 5' (1.524 m), weight 103 kg (226 lb), not currently breastfeeding. Body mass index is 44.14 kg/m².      PHYSICAL EXAM:      General Appearance:   Alert,  cooperative, no distress, obese   HEENT:   Normocephalic, atraumatic, anicteric.     Neck:  Supple, symmetrical, trachea midline   Lungs:   Respirations unlabored    Heart::   Regular rate    Abdomen:   Soft, non-tender, non-distended; normal bowel sounds; no masses, no organomegaly    Genitalia:   Deferred    Rectal:   Deferred    Extremities:  No cyanosis, clubbing or edema    Pulses:  Symmetric    Skin:  No jaundice, rashes, or lesions    Lymph nodes:  No palpable cervical lymphadenopathy        Lab Results:   No visits with results within 1 Day(s) from this visit.   Latest known visit with results is:   Office Visit on 01/23/2024   Component Date Value    White Blood Cell Count 04/04/2024 10.2     Red Blood Cell Count 04/04/2024 4.43     Hemoglobin 04/04/2024 12.4     HCT 04/04/2024 38.4     MCV 04/04/2024 87     MCH 04/04/2024 28.0     MCHC 04/04/2024 32.3     RDW 04/04/2024 12.8     Platelet Count 04/04/2024 478 (H)     TSH ([facility]) 04/04/2024 2.1     Free T4 by Dialysis/Mass* 04/04/2024 1.1          Radiology Results:   No results found.      David Weeks  GI Fellow      David Weeks MD  6/19/2024 10:29 AM  Sign when Signing Visit  [facility] - Outpatient Follow-up Note  Zoë Renee 33 y.o. female MRN: [id number]  Encounter: [id number]          ASSESSMENT AND PLAN:    34 yo F with history of class III obesity, HTN, PUD and Crohn's disease involving small and large bowel s/p ileocecectomy on Stelara who presents for GI follow up. She continues on Stelara 90 mg [id number] with prior auth expiring next month.     Crohn's disease  - continue stelara  - repeat biologic labs  - plan for repeat colonoscopy   - sees optometrist, previously ophthalmologist, sees gyn for annual pap smears  - no recent derm visit     2. GERD  - continue pantoprazole 40 mg po daily  - GERD lifestyle changes discussed, including avoidance of trigger foods (potential foods include coffee, caffeine,  chocolate, mint, tomato-based products, spicy foods, fatty foods), avoid tight fitting clothing, elevate head of bed 30 degrees, avoid eating 2-3 hours prior to bedtime, weight loss, avoid alcohol, avoid tobacco use.   - plan for EGD     MR cleaning    ______________________________________________________________________    SUBJECTIVE:  32 yo F with history of class III obesity, HTN, PUD and Crohn's disease involving small and large bowel s/p ileocecectomy with side-to-end ileal to transverse colonic anastomosis  and surgery for vipul-anal abscess with fistula on Stelara who presents for GI follow up. She continues on Stelara with prior auth expiring next month. She is on 90 mg [id number]. Previous on Lialda. Previously on Humira, methotrexate (still on po for RA). Diagnosed over 10 yrs ago. Hx of ileocecectomy with ileosigmoid repair (2014). She reports over the past couple months she has belching and has significant pressure in her upper abd. She will eat cereal and will start to feel nauseous. She takes pantoprazole 40 mg po daily. EGD in 3008 showing 1 cm pre-pyloric clean based ulcer with gastric bx negative for H pylori. She says peanut butter gives her heartburn. Doing well from Crohn's standpoint. Has small flares every few months, but these are mild and do not require steroids or hospitalization. Last was a few months ago. She gets nausea, fatigue, 5-6 loose stools a day.     Colonoscopy from 11/15/21 showing hemorrhoids, end-to-end ileocolonic anastomosis with mild erythema with plan for repeat in 3 yrs. Path showed mild acute inflammation with mildly active chronic colitis     Answers submitted by the patient for this visit:  Inflammatory Bowel Disease (Submitted on 6/19/2024)  When you are not experiencing symptoms of your inflammatory bowel disease, how many bowel movements do you typically have each day?: 3  What is the average (typical) number of bowel movements that you had in a single day during  the last week?: 4  Over the last 3 days, have you had any bowel movements where you passed blood without stool?: No  Since your last visit, have you received any vaccinations?: No  Since the last visit, have you had an infection?: No  Is there any possibility that you may be pregnant?: No  In the past three months, have you used tobacco in any form?: No  During the last year, how many days have you missed work or school because of your inflammatory bowel disease?: 5  During the last year, how many days have you been hospitalized because of your inflammatory bowel disease?: 0  During the last year, how many days have you visited a hospital emergency department because of your inflammatory bowel disease?: 0  During the last month, have you taken narcotic pain medications (such as Percocet, oxycodone, Oxycontin, morphine, Vicodin, Dilaudid, MS Contin) for your inflammatory bowel disease?: No  Have you awoken at night because you needed to move your bowels during the last month? : Yes  Have you had leakage of stool while sleeping during the last month?: Yes  Have you had leakage of stool while you were awake during the last month?: Yes  In the last 6 months, have you unintentionally lost weight?: No  Fever: No  Eye irritation: No  Mouth sores: Yes  Sore throat: No  Chest pain: No  Shortness of breath: Yes  Numbness or tingling in your hands or feet: Yes  Skin rash: No  Pain or swelling in your joints: Yes  Bruising or bleeding: No  Felt depressed or blue: Yes      REVIEW OF SYSTEMS IS OTHERWISE NEGATIVE.      Historical Information  Past Medical History:   Diagnosis Date    Chronic PID (chronic pelvic inflammatory disease)     Crohn's disease (HCC)     Depression 2019    Hypertension 8/17/22    Obesity     Papanicolaou smear 2020    Pseudotumor     Bilat.     RA (rheumatoid arthritis) (HCC)     Rheumatoid arthritis (HCC)     Ulcerative colitis (HCC)      Past Surgical History:   Procedure Laterality Date    APPENDECTOMY       2010    BOWEL RESECTION      2014    COLON SURGERY      COLONOSCOPY  2016    COLONOSCOPY      COLPOSCOPY  2019    DXA PROCEDURE (HISTORICAL)  2019    [facility]    HERNIA REPAIR      [place] REPAIR FIRST ABDOMINAL WALL HERNIA N/A 4/26/2016    Procedure: OPEN INCISIONAL HERNIA REPAIR X4 W/ UMBILICAL HERNIA REPAIR WITH MESH;  Surgeon: Neo Snyder MD;  Location: [facility];  Service: General    SALPINGECTOMY Right     2011     Social History  Social History     Substance and Sexual Activity   Alcohol Use Not Currently    Comment: rare     Social History     Substance and Sexual Activity   Drug Use Yes    Types: Marijuana     Social History     Tobacco Use   Smoking Status Never    Passive exposure: Never   Smokeless Tobacco Never     Family History   Problem Relation Age of Onset    Heart attack Maternal Grandfather     Heart attack Maternal Grandmother     Stroke Maternal Grandmother     Breast cancer Neg Hx     Uterine cancer Neg Hx     Ovarian cancer Neg Hx     Colon cancer Neg Hx        Meds/Allergies      Current Outpatient Medications:     acetaminophen (Tylenol) 325 mg tablet    ammonium lactate (LAC-HYDRIN) 12 % cream    ammonium lactate (LAC-HYDRIN) 12 % cream    atorvastatin (LIPITOR) 20 mg tablet    ciprofloxacin (CIPRO) 500 mg tablet    DULoxetine (CYMBALTA) 60 mg delayed release capsule    folic acid (FOLVITE) 1 mg tablet    lisinopril (ZESTRIL) 10 mg tablet    LORazepam (ATIVAN) 0.5 mg tablet    methotrexate 2.5 MG tablet    Multiple Vitamins-Minerals (MULTIVITAMIN ADULT, MINERALS, PO)    norgestrel-ethinyl estradiol (Low-Ogestrel) 0.3 mg-30 mcg per tablet    pantoprazole (PROTONIX) 20 mg tablet    ustekinumab (Stelara) 90 mg/mL subcutaneous injection    nystatin-triamcinolone (MYCOLOG-II) ointment    Allergies   Allergen Reactions    Humira [Adalimumab] Other (See Comments)     Made psoriasis worsen           Objective    not currently breastfeeding. There is no height or weight on file to calculate  BMI.      PHYSICAL EXAM:      General Appearance:   Alert, cooperative, no distress   HEENT:   Normocephalic, atraumatic, anicteric.     Neck:  Supple, symmetrical, trachea midline   Lungs:   Respirations unlabored    Heart::   Regular rate    Abdomen:   Soft, non-tender, non-distended; normal bowel sounds; no masses, no organomegaly    Genitalia:   Deferred    Rectal:   Deferred    Extremities:  No cyanosis, clubbing or edema    Pulses:  Symmetric    Skin:  No jaundice, rashes, or lesions    Lymph nodes:  No palpable cervical lymphadenopathy        Lab Results:   No visits with results within 1 Day(s) from this visit.   Latest known visit with results is:   Office Visit on 01/23/2024   Component Date Value    White Blood Cell Count 04/04/2024 10.2     Red Blood Cell Count 04/04/2024 4.43     Hemoglobin 04/04/2024 12.4     HCT 04/04/2024 38.4     MCV 04/04/2024 87     MCH 04/04/2024 28.0     MCHC 04/04/2024 32.3     RDW 04/04/2024 12.8     Platelet Count 04/04/2024 478 (H)     TSH ([facility]) 04/04/2024 2.1     Free T4 by Dialysis/Mass* 04/04/2024 1.1          Radiology Results:   No results found.      David Weeks  GI Fellow

## 2024-06-19 NOTE — PROGRESS NOTES
[facility] - Outpatient Follow-up Note  Zoë Renee 33 y.o. female MRN: [id number]  Encounter: [id number]          ASSESSMENT AND PLAN:    32 yo F with history of class III obesity, HTN, PUD and Crohn's disease involving small and large bowel s/p ileocecectomy for ileo-sigmoid fistula with previous vipul-anal abscess on Stelara who presents for GI follow up. She continues on Stelara 90 mg [id number] and is overall doing well, but having worsening GERD symptoms.     Crohn's disease  - continue stelara  - repeat biologic labs  - plan for repeat colonoscopy   - sees optometrist, previously ophthalmologist, sees gyn for annual pap smears  - no recent derm visit   - discussed procedure. Consent signed. Miralax/Dulcolax prep ordered  - refer to ophthalmology and dermatology   - check CBC, CMP, CRP, hepatitis serologies, Quantiferon Gold, Vit D and B12  - check MR enterography     2. GERD  - continue pantoprazole 40 mg po daily  - GERD lifestyle changes discussed, including avoidance of trigger foods (potential foods include coffee, caffeine, chocolate, mint, tomato-based products, spicy foods, fatty foods), avoid tight fitting clothing, elevate head of bed 30 degrees, avoid eating 2-3 hours prior to bedtime, weight loss, avoid alcohol, avoid tobacco use.   - plan for EGD     ______________________________________________________________________    SUBJECTIVE:  32 yo F with history of class III obesity, HTN, PUD and Crohn's disease involving small and large bowel s/p ileocecectomy with side-to-end ileal to transverse colonic anastomosis  and surgery for vipul-anal abscess with fistula on Stelara who presents for GI follow up. She continues on Stelara with prior auth expiring next month. She is on 90 mg [id number]. Previous on Lialda. Previously on Humira, methotrexate (still on po for RA). Diagnosed over 10 yrs ago. Hx of ileocecectomy with ileosigmoid repair (2014). She reports over the past  couple months she has belching and has significant pressure in her upper abd. She will eat cereal and will start to feel nauseous. She takes pantoprazole 40 mg po daily. EGD in 2008 showing 1 cm pre-pyloric clean based ulcer with gastric bx negative for H pylori. She says peanut butter gives her heartburn. Doing well from Crohn's standpoint. Has small flares every few months, but these are mild and do not require steroids or hospitalization. Last was a few months ago. She gets nausea, fatigue, 5-6 loose stools a day. Denies fevers, chills, weight loss, vomiting, abd pain, diarrhea, constipation. No nsaid use. Rare alcohol use, no tobacco use. No FH of IBD.     Colonoscopy from 11/15/21 showing hemorrhoids, end-to-end ileocolonic anastomosis with mild erythema with plan for repeat in 3 yrs. Path showed mild acute inflammation with mildly active chronic colitis     Answers submitted by the patient for this visit:  Inflammatory Bowel Disease (Submitted on 6/19/2024)  When you are not experiencing symptoms of your inflammatory bowel disease, how many bowel movements do you typically have each day?: 3  What is the average (typical) number of bowel movements that you had in a single day during the last week?: 4  Over the last 3 days, have you had any bowel movements where you passed blood without stool?: No  Since your last visit, have you received any vaccinations?: No  Since the last visit, have you had an infection?: No  Is there any possibility that you may be pregnant?: No  In the past three months, have you used tobacco in any form?: No  During the last year, how many days have you missed work or school because of your inflammatory bowel disease?: 5  During the last year, how many days have you been hospitalized because of your inflammatory bowel disease?: 0  During the last year, how many days have you visited a hospital emergency department because of your inflammatory bowel disease?: 0  During the last month,  have you taken narcotic pain medications (such as Percocet, oxycodone, Oxycontin, morphine, Vicodin, Dilaudid, MS Contin) for your inflammatory bowel disease?: No  Have you awoken at night because you needed to move your bowels during the last month? : Yes  Have you had leakage of stool while sleeping during the last month?: Yes  Have you had leakage of stool while you were awake during the last month?: Yes  In the last 6 months, have you unintentionally lost weight?: No  Fever: No  Eye irritation: No  Mouth sores: Yes  Sore throat: No  Chest pain: No  Shortness of breath: Yes  Numbness or tingling in your hands or feet: Yes  Skin rash: No  Pain or swelling in your joints: Yes  Bruising or bleeding: No  Felt depressed or blue: Yes      REVIEW OF SYSTEMS IS OTHERWISE NEGATIVE.      Historical Information   Past Medical History:   Diagnosis Date    Chronic PID (chronic pelvic inflammatory disease)     Crohn's disease (HCC)     Depression 2019    Hypertension 8/17/22    Obesity     Papanicolaou smear 2020    Pseudotumor     Bilat.     RA (rheumatoid arthritis) (HCC)     Rheumatoid arthritis (HCC)     Ulcerative colitis (HCC)      Past Surgical History:   Procedure Laterality Date    APPENDECTOMY      2010    BOWEL RESECTION      2014    COLON SURGERY      COLONOSCOPY  2016    COLONOSCOPY      COLPOSCOPY  2019    DXA PROCEDURE (HISTORICAL)  2019    [facility]    HERNIA REPAIR      [place] REPAIR FIRST ABDOMINAL WALL HERNIA N/A 4/26/2016    Procedure: OPEN INCISIONAL HERNIA REPAIR X4 W/ UMBILICAL HERNIA REPAIR WITH MESH;  Surgeon: Neo Snyder MD;  Location:  MAIN OR;  Service: General    SALPINGECTOMY Right     2011     Social History   Social History     Substance and Sexual Activity   Alcohol Use Not Currently    Comment: rare     Social History     Substance and Sexual Activity   Drug Use Yes    Types: Marijuana     Social History     Tobacco Use   Smoking Status Never    Passive exposure: Never   Smokeless Tobacco Never      Family History   Problem Relation Age of Onset    Heart attack Maternal Grandfather     Heart attack Maternal Grandmother     Stroke Maternal Grandmother     Breast cancer Neg Hx     Uterine cancer Neg Hx     Ovarian cancer Neg Hx     Colon cancer Neg Hx        Meds/Allergies       Current Outpatient Medications:     acetaminophen (Tylenol) 325 mg tablet    ammonium lactate (LAC-HYDRIN) 12 % cream    ammonium lactate (LAC-HYDRIN) 12 % cream    atorvastatin (LIPITOR) 20 mg tablet    bisacodyl (DULCOLAX) 5 mg EC tablet    ciprofloxacin (CIPRO) 500 mg tablet    DULoxetine (CYMBALTA) 60 mg delayed release capsule    folic acid (FOLVITE) 1 mg tablet    lisinopril (ZESTRIL) 10 mg tablet    LORazepam (ATIVAN) 0.5 mg tablet    methotrexate 2.5 MG tablet    Multiple Vitamins-Minerals (MULTIVITAMIN ADULT, MINERALS, PO)    norgestrel-ethinyl estradiol (Low-Ogestrel) 0.3 mg-30 mcg per tablet    pantoprazole (PROTONIX) 20 mg tablet    polyethylene glycol (MiraLax) 17 GM/SCOOP powder    ustekinumab (Stelara) 90 mg/mL subcutaneous injection    nystatin-triamcinolone (MYCOLOG-II) ointment    Allergies   Allergen Reactions    Humira [Adalimumab] Other (See Comments)     Made psoriasis worsen           Objective     Blood pressure 121/70, temperature 98 °F (36.7 °C), temperature source Tympanic, height 5' (1.524 m), weight 103 kg (226 lb), not currently breastfeeding. Body mass index is 44.14 kg/m².      PHYSICAL EXAM:      General Appearance:   Alert, cooperative, no distress, obese   HEENT:   Normocephalic, atraumatic, anicteric.     Neck:  Supple, symmetrical, trachea midline   Lungs:   Respirations unlabored    Heart::   Regular rate    Abdomen:   Soft, non-tender, non-distended; normal bowel sounds; no masses, no organomegaly    Genitalia:   Deferred    Rectal:   Deferred    Extremities:  No cyanosis, clubbing or edema    Pulses:  Symmetric    Skin:  No jaundice, rashes, or lesions    Lymph nodes:  No palpable cervical  lymphadenopathy        Lab Results:   No visits with results within 1 Day(s) from this visit.   Latest known visit with results is:   Office Visit on 01/23/2024   Component Date Value    White Blood Cell Count 04/04/2024 10.2     Red Blood Cell Count 04/04/2024 4.43     Hemoglobin 04/04/2024 12.4     HCT 04/04/2024 38.4     MCV 04/04/2024 87     MCH 04/04/2024 28.0     MCHC 04/04/2024 32.3     RDW 04/04/2024 12.8     Platelet Count 04/04/2024 478 (H)     TSH (ICMA) 04/04/2024 2.1     Free T4 by Dialysis/Mass* 04/04/2024 1.1          Radiology Results:   No results found.      David Weeks  GI Fellow

## 2024-06-21 NOTE — TELEPHONE ENCOUNTER
Medication: Stelara 90mg syringe  Directions: inject 90mg every 8 weeks  Quantity: 1ml  Day Supply: 56  Insurance: [employer] [facility]  How Prior Auth was submitted: Shannan  Authorization Date range: 6/21/2024 - 6/21/2025  Authorization Number: [id number]  Pharmacy that fills med: [facility]      Letter in Media

## 2024-07-07 DIAGNOSIS — N92.1 MENORRHAGIA WITH IRREGULAR CYCLE: ICD-10-CM

## 2024-07-08 RX ORDER — NORGESTREL AND ETHINYL ESTRADIOL 0.3-0.03MG
1 KIT ORAL DAILY
Qty: 84 TABLET | Refills: 1 | Status: SHIPPED | OUTPATIENT
Start: 2024-07-08

## 2024-07-29 ENCOUNTER — ANESTHESIA (OUTPATIENT)
Dept: ANESTHESIOLOGY | Facility: HOSPITAL | Age: 34
End: 2024-07-29

## 2024-07-29 ENCOUNTER — ANESTHESIA EVENT (OUTPATIENT)
Dept: ANESTHESIOLOGY | Facility: HOSPITAL | Age: 34
End: 2024-07-29

## 2024-08-08 ENCOUNTER — HOSPITAL ENCOUNTER (OUTPATIENT)
Dept: GASTROENTEROLOGY | Facility: AMBULATORY SURGERY CENTER | Age: 34
Setting detail: OUTPATIENT SURGERY
Discharge: HOME | End: 2024-08-08
Payer: COMMERCIAL

## 2024-08-08 ENCOUNTER — ANESTHESIA (OUTPATIENT)
Dept: GASTROENTEROLOGY | Facility: AMBULATORY SURGERY CENTER | Age: 34
End: 2024-08-08

## 2024-08-08 ENCOUNTER — ANESTHESIA EVENT (OUTPATIENT)
Dept: GASTROENTEROLOGY | Facility: AMBULATORY SURGERY CENTER | Age: 34
End: 2024-08-08

## 2024-08-08 VITALS
OXYGEN SATURATION: 100 % | RESPIRATION RATE: 16 BRPM | HEART RATE: 76 BPM | DIASTOLIC BLOOD PRESSURE: 97 MMHG | SYSTOLIC BLOOD PRESSURE: 139 MMHG | WEIGHT: 226 LBS | TEMPERATURE: 98.7 F | HEIGHT: 60 IN | BODY MASS INDEX: 44.37 KG/M2

## 2024-08-08 DIAGNOSIS — K50.813 CROHN'S DISEASE OF BOTH SMALL AND LARGE INTESTINE WITH FISTULA (HCC): ICD-10-CM

## 2024-08-08 PROBLEM — I10 HTN (HYPERTENSION): Status: ACTIVE | Noted: 2024-08-08

## 2024-08-08 LAB
EXT PREGNANCY TEST URINE: NEGATIVE
EXT. CONTROL: NORMAL

## 2024-08-08 PROCEDURE — 88305 TISSUE EXAM BY PATHOLOGIST: CPT | Performed by: PATHOLOGY

## 2024-08-08 PROCEDURE — 45385 COLONOSCOPY W/LESION REMOVAL: CPT | Performed by: STUDENT IN AN ORGANIZED HEALTH CARE EDUCATION/TRAINING PROGRAM

## 2024-08-08 PROCEDURE — 43235 EGD DIAGNOSTIC BRUSH WASH: CPT | Performed by: STUDENT IN AN ORGANIZED HEALTH CARE EDUCATION/TRAINING PROGRAM

## 2024-08-08 RX ORDER — SODIUM CHLORIDE, SODIUM LACTATE, POTASSIUM CHLORIDE, CALCIUM CHLORIDE 600; 310; 30; 20 MG/100ML; MG/100ML; MG/100ML; MG/100ML
50 INJECTION, SOLUTION INTRAVENOUS CONTINUOUS
Status: DISCONTINUED | OUTPATIENT
Start: 2024-08-08 | End: 2024-08-12 | Stop reason: HOSPADM

## 2024-08-08 RX ORDER — PROPOFOL 10 MG/ML
INJECTION, EMULSION INTRAVENOUS AS NEEDED
Status: DISCONTINUED | OUTPATIENT
Start: 2024-08-08 | End: 2024-08-08

## 2024-08-08 RX ADMIN — PROPOFOL 100 MG: 10 INJECTION, EMULSION INTRAVENOUS at 11:20

## 2024-08-08 RX ADMIN — PROPOFOL 100 MG: 10 INJECTION, EMULSION INTRAVENOUS at 11:33

## 2024-08-08 RX ADMIN — SODIUM CHLORIDE, SODIUM LACTATE, POTASSIUM CHLORIDE, CALCIUM CHLORIDE: 600; 310; 30; 20 INJECTION, SOLUTION INTRAVENOUS at 11:37

## 2024-08-08 RX ADMIN — PROPOFOL 100 MG: 10 INJECTION, EMULSION INTRAVENOUS at 11:24

## 2024-08-08 RX ADMIN — PROPOFOL 100 MG: 10 INJECTION, EMULSION INTRAVENOUS at 11:17

## 2024-08-08 RX ADMIN — SODIUM CHLORIDE, SODIUM LACTATE, POTASSIUM CHLORIDE, CALCIUM CHLORIDE: 600; 310; 30; 20 INJECTION, SOLUTION INTRAVENOUS at 11:16

## 2024-08-08 NOTE — H&P
History and Physical - [facility]  Zoë Renee 33 y.o. female MRN: [id number]    HPI: Zoë Renee is a 33 y.o. female who presents for EGD/colonoscopy.    REVIEW OF SYSTEMS: Per the HPI, and otherwise unremarkable.    Historical Information   Past Medical History:   Diagnosis Date    Chronic PID (chronic pelvic inflammatory disease)     Crohn's disease (HCC)     Depression 2019    Hypertension 8/17/22    Obesity     Papanicolaou smear 2020    Pseudotumor     Bilat.     RA (rheumatoid arthritis) (HCC)     Rheumatoid arthritis (HCC)     Ulcerative colitis (HCC)      Past Surgical History:   Procedure Laterality Date    APPENDECTOMY      2010    BOWEL RESECTION      2014    COLON SURGERY      COLONOSCOPY  2016    COLONOSCOPY      COLPOSCOPY  2019    DXA PROCEDURE (HISTORICAL)  2019    [facility]    HERNIA REPAIR      [place] REPAIR FIRST ABDOMINAL WALL HERNIA N/A 4/26/2016    Procedure: OPEN INCISIONAL HERNIA REPAIR X4 W/ UMBILICAL HERNIA REPAIR WITH MESH;  Surgeon: Neo Snyder MD;  Location:  MAIN OR;  Service: General    SALPINGECTOMY Right     2011     Social History   Social History     Substance and Sexual Activity   Alcohol Use Not Currently    Comment: rare     Social History     Substance and Sexual Activity   Drug Use Yes    Types: Marijuana    Comment: medical     Social History     Tobacco Use   Smoking Status Never    Passive exposure: Never   Smokeless Tobacco Never     Family History   Problem Relation Age of Onset    Heart attack Maternal Grandfather     Heart attack Maternal Grandmother     Stroke Maternal Grandmother     Breast cancer Neg Hx     Uterine cancer Neg Hx     Ovarian cancer Neg Hx     Colon cancer Neg Hx        Meds/Allergies       Current Outpatient Medications:     acetaminophen (Tylenol) 325 mg tablet    atorvastatin (LIPITOR) 20 mg tablet    bisacodyl (DULCOLAX) 5 mg EC tablet    DULoxetine (CYMBALTA) 60 mg delayed release capsule    folic acid  (FOLVITE) 1 mg tablet    lisinopril (ZESTRIL) 10 mg tablet    methotrexate 2.5 MG tablet    Multiple Vitamins-Minerals (MULTIVITAMIN ADULT, MINERALS, PO)    norgestrel-ethinyl estradiol (Elinest) 0.3 mg-30 mcg per tablet    pantoprazole (PROTONIX) 20 mg tablet    polyethylene glycol (MiraLax) 17 GM/SCOOP powder    ammonium lactate (LAC-HYDRIN) 12 % cream    ammonium lactate (LAC-HYDRIN) 12 % cream    ciprofloxacin (CIPRO) 500 mg tablet    LORazepam (ATIVAN) 0.5 mg tablet    nystatin-triamcinolone (MYCOLOG-II) ointment    ustekinumab (Stelara) 90 mg/mL subcutaneous injection    Current Facility-Administered Medications:     lactated ringers infusion, 50 mL/hr, Intravenous, Continuous    Allergies   Allergen Reactions    Humira [Adalimumab] Other (See Comments)     Made psoriasis worsen       Objective     /90   Pulse 94   Temp 98.7 °F (37.1 °C) (Temporal)   Resp 20   Ht 5' (1.524 m)   Wt 103 kg (226 lb)   LMP 07/18/2024 (Approximate)   SpO2 100%   BMI 44.14 kg/m²     PHYSICAL EXAM    General Appearance: NAD, cooperative, alert  Eyes: Anicteric  GI:  Soft, non-tender, non-distended; normal bowel sounds; no masses, no organomegaly   Rectal: Deferred until procedure  Musculoskeletal: No edema.  Skin:  No jaundice    ASSESSMENT/PLAN:  This is a 33 y.o. year old female here for EGD/colonoscopy, and she is stable and optimized for her procedure.

## 2024-08-08 NOTE — ANESTHESIA PREPROCEDURE EVALUATION
Procedure:  COLONOSCOPY  EGD    Relevant Problems   CARDIO   (+) HTN (hypertension)   (+) Rheumatoid aortitis      Other   (+) BMI 40.0-44.9, adult (HCC)   (+) Crohn's disease (HCC)      Pseudotumor cerebri  Depression    Physical Exam    Airway  Comment: Short thick neck  Mallampati score: I  TM Distance: >3 FB  Neck ROM: full     Dental   Comment: None loose, No notable dental hx     Cardiovascular      Pulmonary      Other Findings  post-pubertal.      Anesthesia Plan  ASA Score- 3     Anesthesia Type- IV sedation with anesthesia with ASA Monitors.         Additional Monitors:     Airway Plan:     Comment: 06:30 - last of PO bowel prep    Patient educated on the possibility for awareness under sedation and of the possibility of airway intervention in the event of an airway or procedural emergency  .       Plan Factors-Exercise tolerance (METS): >4 METS.    Chart reviewed.    Patient summary reviewed.    Patient is not a current smoker.              Induction- intravenous.    Postoperative Plan-         Informed Consent- Anesthetic plan and risks discussed with patient.  I personally reviewed this patient with the CRNA. Discussed and agreed on the Anesthesia Plan with the CRNA..

## 2024-08-08 NOTE — ANESTHESIA POSTPROCEDURE EVALUATION
Post-Op Assessment Note    CV Status:  Stable  Pain Score: 0    Pain management: adequate       Mental Status:  Alert and awake   Hydration Status:  Euvolemic   PONV Controlled:  Controlled   Airway Patency:  Patent     Post Op Vitals Reviewed: Yes    No anethesia notable event occurred.    Staff: CRNA               BP   105/68   Temp   98   Pulse  93   Resp   16   SpO2   98

## 2024-08-14 ENCOUNTER — HOSPITAL ENCOUNTER (OUTPATIENT)
Dept: MRI IMAGING | Facility: HOSPITAL | Age: 34
Discharge: HOME | End: 2024-08-14
Payer: COMMERCIAL

## 2024-08-14 DIAGNOSIS — K50.813 CROHN'S DISEASE OF BOTH SMALL AND LARGE INTESTINE WITH FISTULA (HCC): ICD-10-CM

## 2024-08-14 PROCEDURE — 74183 MRI ABD W/O CNTR FLWD CNTR: CPT

## 2024-08-14 PROCEDURE — A9585 GADOBUTROL INJECTION: HCPCS | Performed by: STUDENT IN AN ORGANIZED HEALTH CARE EDUCATION/TRAINING PROGRAM

## 2024-08-14 PROCEDURE — 72197 MRI PELVIS W/O & W/DYE: CPT

## 2024-08-14 RX ORDER — GADOBUTROL 604.72 MG/ML
10 INJECTION INTRAVENOUS
Status: COMPLETED | OUTPATIENT
Start: 2024-08-14 | End: 2024-08-14

## 2024-08-14 RX ADMIN — GLUCAGON 1 MG: KIT at 10:10

## 2024-08-14 RX ADMIN — GADOBUTROL 10 ML: 604.72 INJECTION INTRAVENOUS at 10:19

## 2024-08-21 ENCOUNTER — TELEPHONE (OUTPATIENT)
Dept: GASTROENTEROLOGY | Facility: CLINIC | Age: 34
End: 2024-08-21

## 2024-08-21 NOTE — TELEPHONE ENCOUNTER
----- Message from Yasmin TOMPKINS sent at 8/21/2024  9:48 AM EDT -----    ----- Message -----  From: David Weeks MD  Sent: 8/20/2024   6:19 PM EDT  To: #    Krysten this is david GI fellow, can we get this pt with Crohn's disease in for appt in 4-8 weeks please with any provider (me if available) to discuss her Crohn's and GERD?    Thanks,    David

## 2024-08-21 NOTE — TELEPHONE ENCOUNTER
Called and left a message for patient to call back to schedule office visit with Dr. Weeks on 10/16 if there are still times available. If not she can see another provider.

## 2024-08-22 NOTE — TELEPHONE ENCOUNTER
Called and left a message for patient to call back to schedule office visit for 4-8 weeks to discuss her Crohn's and GERD. Sent a myc letter as well

## 2024-11-11 ENCOUNTER — TELEPHONE (OUTPATIENT)
Dept: GASTROENTEROLOGY | Facility: CLINIC | Age: 34
End: 2024-11-11

## 2024-11-11 NOTE — TELEPHONE ENCOUNTER
LMOM In review of our records it shows you are due for a follow up appointment:    Please call the office to schedule your appointment:    We look forward to hearing from you.      Sincerely,     [facility].

## 2025-01-01 DIAGNOSIS — N92.1 MENORRHAGIA WITH IRREGULAR CYCLE: ICD-10-CM

## 2025-01-02 DIAGNOSIS — N92.1 MENORRHAGIA WITH IRREGULAR CYCLE: ICD-10-CM

## 2025-01-02 RX ORDER — NORGESTREL AND ETHINYL ESTRADIOL 0.3-0.03MG
1 KIT ORAL DAILY
Qty: 28 TABLET | Refills: 0 | Status: SHIPPED | OUTPATIENT
Start: 2025-01-02 | End: 2025-01-02 | Stop reason: SDUPTHER

## 2025-01-02 RX ORDER — NORGESTREL AND ETHINYL ESTRADIOL 0.3-0.03MG
1 KIT ORAL DAILY
Qty: 28 TABLET | Refills: 0 | Status: SHIPPED | OUTPATIENT
Start: 2025-01-02

## 2025-01-13 ENCOUNTER — ANNUAL EXAM (OUTPATIENT)
Dept: OBGYN CLINIC | Facility: CLINIC | Age: 35
End: 2025-01-13
Payer: COMMERCIAL

## 2025-01-13 VITALS
WEIGHT: 225 LBS | DIASTOLIC BLOOD PRESSURE: 90 MMHG | SYSTOLIC BLOOD PRESSURE: 130 MMHG | BODY MASS INDEX: 44.17 KG/M2 | HEIGHT: 60 IN

## 2025-01-13 DIAGNOSIS — Z12.4 SCREENING FOR CERVICAL CANCER: ICD-10-CM

## 2025-01-13 DIAGNOSIS — Z01.419 ENCOUNTER FOR GYNECOLOGICAL EXAMINATION WITHOUT ABNORMAL FINDING: Primary | ICD-10-CM

## 2025-01-13 DIAGNOSIS — N92.1 MENORRHAGIA WITH IRREGULAR CYCLE: ICD-10-CM

## 2025-01-13 DIAGNOSIS — Z23 NEED FOR HPV VACCINATION: ICD-10-CM

## 2025-01-13 DIAGNOSIS — I01.1 RHEUMATOID AORTITIS: ICD-10-CM

## 2025-01-13 DIAGNOSIS — B97.7 HPV IN FEMALE: ICD-10-CM

## 2025-01-13 PROCEDURE — 99395 PREV VISIT EST AGE 18-39: CPT | Performed by: OBSTETRICS & GYNECOLOGY

## 2025-01-13 PROCEDURE — 90471 IMMUNIZATION ADMIN: CPT | Performed by: OBSTETRICS & GYNECOLOGY

## 2025-01-13 PROCEDURE — 90651 9VHPV VACCINE 2/3 DOSE IM: CPT | Performed by: OBSTETRICS & GYNECOLOGY

## 2025-01-13 RX ORDER — NORGESTREL AND ETHINYL ESTRADIOL 0.3-0.03MG
1 KIT ORAL DAILY
Qty: 90 TABLET | Refills: 3 | Status: SHIPPED | OUTPATIENT
Start: 2025-01-13

## 2025-01-13 NOTE — PROGRESS NOTES
[facility] - [address], [address], [place] [address]    ASSESSMENT/PLAN: Zoë Renee is a 34 y.o.  who presents for annual gynecologic exam.    Encounter for routine gynecologic examination  - Routine well woman exam completed today.  - Cervical Cancer Screening: Current ASCCP Guidelines reviewed. Last Pap: 2023 . Next Pap Due: today  hx of hpv   - blood pressure   elevated moving to  beta blocker   - STI screening offered including HIV testing: Declined  - Contraceptive counseling discussed.  Current contraception: condoms or combination OCPs:       Additional problems addressed during this visit:  1. Encounter for gynecological examination without abnormal finding  2. Screening for cervical cancer  3. HPV in female  -     IGP, Aptima HPV, Rfx 16/18,45  4. BMI 40.0-44.9, adult ([facility])  5. Rheumatoid aortitis  6. Menorrhagia with irregular cycle  -     norgestrel-ethinyl estradiol (Elinest) 0.3 mg-30 mcg per tablet; Take 1 tablet by mouth daily  7. Need for HPV vaccination  Comments:  Desires immunization    R+B dw pt  #1 given.  Orders:  -     HPV Vaccine 9 valent IM      CC:  Annual Gynecologic Examination    HPI: Zoë Renee is a 34 y.o.  who presents for annual gynecologic examination.  35 yo  here for wellness exam .  LMP  2025.  + seen by primary today and moving from  Lisinopril to a beta blocker.  BP elevated.   On  OCP  no missed pills .   Primary  aware on OCP.        The following portions of the patient's history were reviewed and updated as appropriate: She  has a past medical history of Chronic PID (chronic pelvic inflammatory disease), Crohn's disease (HCC), Depression (2019), Hypertension (22), Obesity, Papanicolaou smear (2023), Pseudotumor, RA (rheumatoid arthritis) (HCC), Rheumatoid arthritis (HCC), and Ulcerative colitis (HCC).  She  has a past surgical history that includes Salpingectomy (Right); Appendectomy; Bowel  resection; pr repair first abdominal wall hernia (N/A, 04/26/2016); Colonoscopy (2024); DXA procedure(historical) (2019); Colposcopy (2019); Colonoscopy; Colon surgery; and Hernia repair.  Her family history includes Heart attack in her maternal grandfather and maternal grandmother; Stroke in her maternal grandmother.  She  reports that she has never smoked. She has never been exposed to tobacco smoke. She has never used smokeless tobacco. She reports that she does not currently use alcohol. She reports current drug use. Drug: Marijuana.  Current Outpatient Medications   Medication Sig Dispense Refill   • acetaminophen (Tylenol) 325 mg tablet Every 6 hours     • atorvastatin (LIPITOR) 20 mg tablet Take 20 mg by mouth daily     • DULoxetine (CYMBALTA) 60 mg delayed release capsule      • folic acid (FOLVITE) 1 mg tablet Take 1,000 mcg by mouth daily     • lisinopril (ZESTRIL) 10 mg tablet      • LORazepam (ATIVAN) 0.5 mg tablet if needed     • methotrexate 2.5 MG tablet Take 15 mg by mouth once a week     • Multiple Vitamins-Minerals (MULTIVITAMIN ADULT, MINERALS, PO) every 24 hours     • norgestrel-ethinyl estradiol (Elinest) 0.3 mg-30 mcg per tablet Take 1 tablet by mouth daily 90 tablet 3   • pantoprazole (PROTONIX) 20 mg tablet Take 20 mg by mouth daily     • ustekinumab (Stelara) 90 mg/mL subcutaneous injection INJECT 1 SYRINGE UNDER THE SKIN EVERY 8 WEEKS 1 mL 1     No current facility-administered medications for this visit.     She is allergic to humira [adalimumab]..    Review of Systems   Constitutional:  Negative for chills and fever.   HENT:  Negative for ear pain and sore throat.    Eyes:  Negative for pain and visual disturbance.   Respiratory:  Negative for cough and shortness of breath.    Cardiovascular:  Negative for chest pain and palpitations.   Gastrointestinal:  Negative for abdominal pain and vomiting.   Endocrine: Negative.    Genitourinary:  Negative for dysuria and hematuria.    Musculoskeletal:  Negative for arthralgias and back pain.   Skin:  Negative for color change and rash.   Allergic/Immunologic: Negative.    Neurological: Negative.  Negative for seizures and syncope.   Hematological: Negative.    Psychiatric/Behavioral: Negative.     All other systems reviewed and are negative.        Objective:  /90 (BP Location: Left arm, Patient Position: Sitting, Cuff Size: Large)   Ht 5' (1.524 m)   Wt 102 kg (225 lb)   LMP 01/04/2025 (Approximate)   BMI 43.94 kg/m²    Physical Exam  Vitals and nursing note reviewed.   Constitutional:       Appearance: Normal appearance.   HENT:      Head: Normocephalic.   Cardiovascular:      Rate and Rhythm: Normal rate and regular rhythm.      Pulses: Normal pulses.      Heart sounds: Normal heart sounds.   Pulmonary:      Effort: Pulmonary effort is normal.      Breath sounds: Normal breath sounds.   Chest:      Chest wall: No mass, lacerations, swelling, tenderness or edema.   Breasts:     Pj Score is 4.      Breasts are symmetrical.      Right: Normal. No swelling, bleeding, inverted nipple, mass, nipple discharge, skin change or tenderness.      Left: No swelling, bleeding, inverted nipple, mass, nipple discharge, skin change or tenderness.   Abdominal:      General: Abdomen is flat. Bowel sounds are normal.      Palpations: Abdomen is soft.   Genitourinary:     General: Normal vulva.      Exam position: Lithotomy position.      Pubic Area: No rash.       Pj stage (genital): 4.      Labia:         Right: No rash, tenderness or lesion.         Left: No rash, tenderness or lesion.       Urethra: No urethral pain, urethral swelling or urethral lesion.      Vagina: Normal.      Cervix: No cervical motion tenderness or discharge.      Uterus: Normal.       Adnexa: Right adnexa normal and left adnexa normal.      Rectum: Normal.   Musculoskeletal:         General: Normal range of motion.      Cervical back: Neck supple.   Lymphadenopathy:       Upper Body:      Right upper body: No supraclavicular, axillary or pectoral adenopathy.      Left upper body: No supraclavicular, axillary or pectoral adenopathy.      Lower Body: No right inguinal adenopathy. No left inguinal adenopathy.   Skin:     General: Skin is warm and dry.   Neurological:      General: No focal deficit present.      Mental Status: She is alert and oriented to person, place, and time.   Psychiatric:         Mood and Affect: Mood normal.         Behavior: Behavior normal.         Thought Content: Thought content normal.         Judgment: Judgment normal.

## 2025-01-17 LAB
CYTOLOGIST CVX/VAG CYTO: ABNORMAL
DX ICD CODE: ABNORMAL
HPV GENOTYPE REFLEX: ABNORMAL
HPV I/H RISK 4 DNA CVX QL PROBE+SIG AMP: POSITIVE
HPV16 DNA CVX QL PROBE+SIG AMP: NEGATIVE
HPV18+45 E6+E7 MRNA CVX QL NAA+PROBE: NEGATIVE
OTHER STN SPEC: ABNORMAL
PATH REPORT.FINAL DX SPEC: ABNORMAL
SL AMB NOTE:: ABNORMAL
SL AMB SPECIMEN ADEQUACY: ABNORMAL
SL AMB TEST METHODOLOGY: ABNORMAL

## 2025-01-21 ENCOUNTER — RESULTS FOLLOW-UP (OUTPATIENT)
Dept: OBGYN CLINIC | Facility: CLINIC | Age: 35
End: 2025-01-21

## 2025-01-23 ENCOUNTER — PROCEDURE VISIT (OUTPATIENT)
Dept: OBGYN CLINIC | Facility: CLINIC | Age: 35
End: 2025-01-23
Payer: COMMERCIAL

## 2025-01-23 VITALS
BODY MASS INDEX: 44.17 KG/M2 | DIASTOLIC BLOOD PRESSURE: 88 MMHG | SYSTOLIC BLOOD PRESSURE: 130 MMHG | WEIGHT: 225 LBS | HEIGHT: 60 IN

## 2025-01-23 DIAGNOSIS — Z32.02 NEGATIVE PREGNANCY TEST: Primary | ICD-10-CM

## 2025-01-23 DIAGNOSIS — B97.7 HPV IN FEMALE: ICD-10-CM

## 2025-01-23 LAB — SL AMB POCT URINE HCG: NORMAL

## 2025-01-23 PROCEDURE — 57454 BX/CURETT OF CERVIX W/SCOPE: CPT | Performed by: NURSE PRACTITIONER

## 2025-01-23 PROCEDURE — 81025 URINE PREGNANCY TEST: CPT | Performed by: NURSE PRACTITIONER

## 2025-01-23 RX ORDER — METOPROLOL SUCCINATE 50 MG/1
1 TABLET, EXTENDED RELEASE ORAL DAILY
COMMUNITY
Start: 2025-01-13

## 2025-01-23 NOTE — PROGRESS NOTES
Here for colp PAP neg + other HPV x 2 H/o colp years ago On OCP  Colposcopy    Date/Time: 1/23/2025 3:30 PM    Performed by: SIMRAN To  Authorized by: SIMRAN To    Other Assisting Provider: Yes (comment)    Verbal consent obtained?: Yes    Risks and benefits: Risks, benefits and alternatives were discussed    Consent given by:  Patient  Patient states understanding of procedure being performed: Yes    Patient identity confirmed:  Verbally with patient  Indication:     Indications: positive other HPV on pap x 2.  Procedure:     Procedure: Colposcopy w/ cervical biopsy and ECC      Under satisfactory analgesia the patient was prepped and draped in the dorsal lithotomy position: yes      Satin speculum was placed in the vagina: yes      Cervix was cleansed with betadine: yes      Under colposcopic examination the transition zone was seen in entirety: yes      Endocervix was curetted using a Kevorkian curette: yes      Cervical biopsy performed with a cervical biopsy punch: yes (9:00)      Monsel's solution was applied: yes      Specimen(s) to pathology: yes    Post-procedure:     Findings: White epithelium      Impression: Low grade cervical dysplasia      Patient tolerance of procedure:  Tolerated well, no immediate complications  Comments:      Take ibuprofen 4-6 hours from last dose with food for cramping. Light vaginal bleeding with a slight brown or black color (coffee ground appearance) is normal for several days. Call office with vaginal bleeding heavier than a normal menses. No sexual activity x 7 days.

## 2025-01-29 LAB
PATH REPORT.SITE OF ORIGIN SPEC: NORMAL
PAYMENT PROCEDURE: NORMAL
SL AMB .: NORMAL

## 2025-01-30 ENCOUNTER — RESULTS FOLLOW-UP (OUTPATIENT)
Dept: OBGYN CLINIC | Facility: CLINIC | Age: 35
End: 2025-01-30

## 2025-02-12 PROBLEM — Z01.419 ENCOUNTER FOR GYNECOLOGICAL EXAMINATION WITHOUT ABNORMAL FINDING: Status: RESOLVED | Noted: 2025-01-13 | Resolved: 2025-02-12

## 2025-02-12 PROBLEM — Z12.4 SCREENING FOR CERVICAL CANCER: Status: RESOLVED | Noted: 2025-01-13 | Resolved: 2025-02-12

## 2025-02-14 LAB
1,25(OH)2D3 SERPL-MCNC: 55.9 PG/ML (ref 24.8–81.5)
ALBUMIN SERPL-MCNC: 4.5 G/DL (ref 3.9–4.9)
ALP SERPL-CCNC: 56 IU/L (ref 44–121)
ALT SERPL-CCNC: 30 IU/L (ref 0–32)
AST SERPL-CCNC: 25 IU/L (ref 0–40)
BASOPHILS # BLD AUTO: 0.1 X10E3/UL (ref 0–0.2)
BASOPHILS NFR BLD AUTO: 1 %
BILIRUB SERPL-MCNC: <0.2 MG/DL (ref 0–1.2)
BUN SERPL-MCNC: 10 MG/DL (ref 6–20)
BUN/CREAT SERPL: 13 (ref 9–23)
CALCIUM SERPL-MCNC: 9.8 MG/DL (ref 8.7–10.2)
CHLORIDE SERPL-SCNC: 100 MMOL/L (ref 96–106)
CO2 SERPL-SCNC: 19 MMOL/L (ref 20–29)
CREAT SERPL-MCNC: 0.77 MG/DL (ref 0.57–1)
CRP SERPL-MCNC: 9 MG/L (ref 0–10)
EGFR: 103 ML/MIN/1.73
EOSINOPHIL # BLD AUTO: 0.2 X10E3/UL (ref 0–0.4)
EOSINOPHIL NFR BLD AUTO: 2 %
ERYTHROCYTE [DISTWIDTH] IN BLOOD BY AUTOMATED COUNT: 12.9 % (ref 11.7–15.4)
GLOBULIN SER-MCNC: 2.7 G/DL (ref 1.5–4.5)
GLUCOSE SERPL-MCNC: 102 MG/DL (ref 70–99)
HBV SURFACE AB SER-ACNC: 4.1 MIU/ML
HCT VFR BLD AUTO: 42.4 % (ref 34–46.6)
HCV AB S/CO SERPL IA: NON REACTIVE
HGB BLD-MCNC: 13.7 G/DL (ref 11.1–15.9)
IMM GRANULOCYTES # BLD: 0 X10E3/UL (ref 0–0.1)
IMM GRANULOCYTES NFR BLD: 1 %
LYMPHOCYTES # BLD AUTO: 1.3 X10E3/UL (ref 0.7–3.1)
LYMPHOCYTES NFR BLD AUTO: 15 %
MCH RBC QN AUTO: 28.3 PG (ref 26.6–33)
MCHC RBC AUTO-ENTMCNC: 32.3 G/DL (ref 31.5–35.7)
MCV RBC AUTO: 88 FL (ref 79–97)
MONOCYTES # BLD AUTO: 0.4 X10E3/UL (ref 0.1–0.9)
MONOCYTES NFR BLD AUTO: 4 %
NEUTROPHILS # BLD AUTO: 6.8 X10E3/UL (ref 1.4–7)
NEUTROPHILS NFR BLD AUTO: 77 %
PLATELET # BLD AUTO: 517 X10E3/UL (ref 150–450)
POTASSIUM SERPL-SCNC: 4.3 MMOL/L (ref 3.5–5.2)
PROT SERPL-MCNC: 7.2 G/DL (ref 6–8.5)
RBC # BLD AUTO: 4.84 X10E6/UL (ref 3.77–5.28)
SODIUM SERPL-SCNC: 140 MMOL/L (ref 134–144)
VIT B12 SERPL-MCNC: 738 PG/ML (ref 232–1245)
WBC # BLD AUTO: 8.7 X10E3/UL (ref 3.4–10.8)

## 2025-02-18 ENCOUNTER — RESULTS FOLLOW-UP (OUTPATIENT)
Dept: GASTROENTEROLOGY | Facility: CLINIC | Age: 35
End: 2025-02-18

## 2025-02-26 ENCOUNTER — CLINICAL SUPPORT (OUTPATIENT)
Dept: OBGYN CLINIC | Facility: CLINIC | Age: 35
End: 2025-02-26
Payer: COMMERCIAL

## 2025-02-26 VITALS — BODY MASS INDEX: 44.17 KG/M2 | WEIGHT: 225 LBS | HEIGHT: 60 IN

## 2025-02-26 DIAGNOSIS — Z23 NEED FOR HPV VACCINATION: Primary | ICD-10-CM

## 2025-02-26 PROCEDURE — 90651 9VHPV VACCINE 2/3 DOSE IM: CPT

## 2025-02-26 PROCEDURE — 90471 IMMUNIZATION ADMIN: CPT

## 2025-05-27 ENCOUNTER — TELEPHONE (OUTPATIENT)
Age: 35
End: 2025-05-27

## 2025-05-27 NOTE — TELEPHONE ENCOUNTER
Current authorization for Stelara expires 6/21. She has not been seen since 6/2024. Can we please have her scheduled for follow up asap?

## 2025-05-27 NOTE — LETTER
[facility] [address]  [address]  [facility] [address]  Phone#  [phone]  Fax#  [phone]      Sherley 3, 2025      Dear:   Zoë Renee         Our office has attempted to contact you several times regarding your Appointment.  Could you please contact our office at [phone].    Thank you.     Sincerely,

## 2025-06-03 NOTE — TELEPHONE ENCOUNTER
I called pt for the second time to schedule f/u appt, Lvm and requested call back.  Letter mailed.

## 2025-07-14 ENCOUNTER — CLINICAL SUPPORT (OUTPATIENT)
Dept: OBGYN CLINIC | Facility: CLINIC | Age: 35
End: 2025-07-14
Payer: COMMERCIAL

## 2025-07-14 VITALS
DIASTOLIC BLOOD PRESSURE: 90 MMHG | WEIGHT: 225 LBS | BODY MASS INDEX: 44.17 KG/M2 | HEIGHT: 60 IN | SYSTOLIC BLOOD PRESSURE: 130 MMHG

## 2025-07-14 DIAGNOSIS — Z23 NEED FOR HPV VACCINATION: Primary | ICD-10-CM

## 2025-07-14 PROCEDURE — 90471 IMMUNIZATION ADMIN: CPT

## 2025-07-14 PROCEDURE — 90651 9VHPV VACCINE 2/3 DOSE IM: CPT
